# Patient Record
Sex: FEMALE | Race: WHITE | Employment: FULL TIME | URBAN - METROPOLITAN AREA
[De-identification: names, ages, dates, MRNs, and addresses within clinical notes are randomized per-mention and may not be internally consistent; named-entity substitution may affect disease eponyms.]

---

## 2017-01-17 ENCOUNTER — OFFICE VISIT (OUTPATIENT)
Dept: ORTHOPEDIC SURGERY | Age: 57
End: 2017-01-17

## 2017-01-17 VITALS — WEIGHT: 153 LBS | HEIGHT: 67 IN | BODY MASS INDEX: 24.01 KG/M2

## 2017-01-17 DIAGNOSIS — M65.332 TRIGGER MIDDLE FINGER OF LEFT HAND: Primary | ICD-10-CM

## 2017-01-17 PROCEDURE — 99213 OFFICE O/P EST LOW 20 MIN: CPT | Performed by: ORTHOPAEDIC SURGERY

## 2017-04-19 PROBLEM — Z98.890 S/P LEFT KNEE ARTHROSCOPY: Status: ACTIVE | Noted: 2017-04-19

## 2017-04-20 PROBLEM — G89.29 CHRONIC PAIN OF LEFT KNEE: Status: ACTIVE | Noted: 2017-04-20

## 2017-04-20 PROBLEM — S83.232A COMPLEX TEAR OF MEDIAL MENISCUS OF LEFT KNEE AS CURRENT INJURY: Status: ACTIVE | Noted: 2017-04-20

## 2017-04-20 PROBLEM — M25.562 CHRONIC PAIN OF LEFT KNEE: Status: ACTIVE | Noted: 2017-04-20

## 2017-04-20 PROBLEM — M17.12 PRIMARY OSTEOARTHRITIS OF LEFT KNEE: Status: ACTIVE | Noted: 2017-04-20

## 2017-04-20 PROBLEM — M62.9 HAMSTRING TIGHTNESS OF BOTH LOWER EXTREMITIES: Status: ACTIVE | Noted: 2017-04-20

## 2017-07-10 ENCOUNTER — TELEPHONE (OUTPATIENT)
Dept: ORTHOPEDIC SURGERY | Age: 57
End: 2017-07-10

## 2017-08-04 ENCOUNTER — HOSPITAL ENCOUNTER (OUTPATIENT)
Dept: PHYSICAL THERAPY | Age: 57
Discharge: HOME | End: 2017-08-31
Admitting: ORTHOPAEDIC SURGERY

## 2017-10-16 ENCOUNTER — HOSPITAL ENCOUNTER (OUTPATIENT)
Dept: MAMMOGRAPHY | Age: 57
Discharge: HOME | End: 2017-10-16
Attending: INTERNAL MEDICINE | Admitting: INTERNAL MEDICINE

## 2017-10-16 DIAGNOSIS — Z12.31 VISIT FOR SCREENING MAMMOGRAM: ICD-10-CM

## 2018-01-03 ENCOUNTER — OFFICE VISIT (OUTPATIENT)
Dept: INTERNAL MEDICINE | Age: 58
End: 2018-01-03

## 2018-01-03 VITALS
SYSTOLIC BLOOD PRESSURE: 130 MMHG | HEIGHT: 67 IN | WEIGHT: 147 LBS | BODY MASS INDEX: 23.07 KG/M2 | DIASTOLIC BLOOD PRESSURE: 78 MMHG

## 2018-01-03 DIAGNOSIS — Z12.4 PAP SMEAR FOR CERVICAL CANCER SCREENING: ICD-10-CM

## 2018-01-03 DIAGNOSIS — E55.9 VITAMIN D DEFICIENCY: ICD-10-CM

## 2018-01-03 DIAGNOSIS — Z98.890 S/P LEFT KNEE ARTHROSCOPY: ICD-10-CM

## 2018-01-03 DIAGNOSIS — Z00.00 WELL ADULT EXAM: Primary | ICD-10-CM

## 2018-01-03 PROBLEM — M62.9 HAMSTRING TIGHTNESS OF BOTH LOWER EXTREMITIES: Status: RESOLVED | Noted: 2017-04-20 | Resolved: 2018-01-03

## 2018-01-03 PROBLEM — S83.232A COMPLEX TEAR OF MEDIAL MENISCUS OF LEFT KNEE AS CURRENT INJURY: Status: RESOLVED | Noted: 2017-04-20 | Resolved: 2018-01-03

## 2018-01-03 PROBLEM — M25.562 CHRONIC PAIN OF LEFT KNEE: Status: RESOLVED | Noted: 2017-04-20 | Resolved: 2018-01-03

## 2018-01-03 PROBLEM — G89.29 CHRONIC PAIN OF LEFT KNEE: Status: RESOLVED | Noted: 2017-04-20 | Resolved: 2018-01-03

## 2018-01-03 PROCEDURE — 99396 PREV VISIT EST AGE 40-64: CPT | Performed by: INTERNAL MEDICINE

## 2018-01-03 PROCEDURE — 93000 ELECTROCARDIOGRAM COMPLETE: CPT | Performed by: INTERNAL MEDICINE

## 2018-01-05 LAB
A/G RATIO: 1.9 (ref 1.1–2.2)
ALBUMIN SERPL-MCNC: 4.5 G/DL (ref 3.4–5)
ALP BLD-CCNC: 54 U/L (ref 40–129)
ALT SERPL-CCNC: 16 U/L (ref 10–40)
ANION GAP SERPL CALCULATED.3IONS-SCNC: 15 MMOL/L (ref 3–16)
AST SERPL-CCNC: 20 U/L (ref 15–37)
BASOPHILS ABSOLUTE: 0 K/UL (ref 0–0.2)
BASOPHILS RELATIVE PERCENT: 0.6 %
BILIRUB SERPL-MCNC: 0.6 MG/DL (ref 0–1)
BILIRUBIN URINE: NEGATIVE
BLOOD, URINE: NEGATIVE
BUN BLDV-MCNC: 12 MG/DL (ref 7–20)
CALCIUM SERPL-MCNC: 9.6 MG/DL (ref 8.3–10.6)
CHLORIDE BLD-SCNC: 99 MMOL/L (ref 99–110)
CHOLESTEROL, TOTAL: 223 MG/DL (ref 0–199)
CLARITY: CLEAR
CO2: 25 MMOL/L (ref 21–32)
COLOR: YELLOW
CREAT SERPL-MCNC: 0.6 MG/DL (ref 0.6–1.1)
EOSINOPHILS ABSOLUTE: 0.1 K/UL (ref 0–0.6)
EOSINOPHILS RELATIVE PERCENT: 2.9 %
GFR AFRICAN AMERICAN: >60
GFR NON-AFRICAN AMERICAN: >60
GLOBULIN: 2.4 G/DL
GLUCOSE BLD-MCNC: 89 MG/DL (ref 70–99)
GLUCOSE URINE: NEGATIVE MG/DL
HCT VFR BLD CALC: 41.6 % (ref 36–48)
HDLC SERPL-MCNC: 130 MG/DL (ref 40–60)
HEMOGLOBIN: 13.9 G/DL (ref 12–16)
KETONES, URINE: NEGATIVE MG/DL
LDL CHOLESTEROL CALCULATED: 84 MG/DL
LEUKOCYTE ESTERASE, URINE: NEGATIVE
LYMPHOCYTES ABSOLUTE: 1.6 K/UL (ref 1–5.1)
LYMPHOCYTES RELATIVE PERCENT: 36.2 %
MCH RBC QN AUTO: 31.2 PG (ref 26–34)
MCHC RBC AUTO-ENTMCNC: 33.4 G/DL (ref 31–36)
MCV RBC AUTO: 93.5 FL (ref 80–100)
MICROSCOPIC EXAMINATION: NORMAL
MONOCYTES ABSOLUTE: 0.5 K/UL (ref 0–1.3)
MONOCYTES RELATIVE PERCENT: 10.5 %
NEUTROPHILS ABSOLUTE: 2.2 K/UL (ref 1.7–7.7)
NEUTROPHILS RELATIVE PERCENT: 49.8 %
NITRITE, URINE: NEGATIVE
PDW BLD-RTO: 14.3 % (ref 12.4–15.4)
PH UA: 7
PLATELET # BLD: 189 K/UL (ref 135–450)
PMV BLD AUTO: 9.9 FL (ref 5–10.5)
POTASSIUM SERPL-SCNC: 5.2 MMOL/L (ref 3.5–5.1)
PROTEIN UA: NEGATIVE MG/DL
RBC # BLD: 4.45 M/UL (ref 4–5.2)
SODIUM BLD-SCNC: 139 MMOL/L (ref 136–145)
SPECIFIC GRAVITY UA: 1.01
TOTAL PROTEIN: 6.9 G/DL (ref 6.4–8.2)
TRIGL SERPL-MCNC: 43 MG/DL (ref 0–150)
TSH SERPL DL<=0.05 MIU/L-ACNC: 1.6 UIU/ML (ref 0.27–4.2)
URINE TYPE: NORMAL
UROBILINOGEN, URINE: 0.2 E.U./DL
VLDLC SERPL CALC-MCNC: 9 MG/DL
WBC # BLD: 4.4 K/UL (ref 4–11)

## 2018-03-01 PROBLEM — M62.559 ATROPHY OF QUADRICEPS FEMORIS MUSCLE: Status: ACTIVE | Noted: 2018-03-01

## 2018-03-01 PROBLEM — M22.42 CHONDROMALACIA PATELLAE OF LEFT KNEE: Status: ACTIVE | Noted: 2018-03-01

## 2018-03-07 ENCOUNTER — HOSPITAL ENCOUNTER (OUTPATIENT)
Dept: PHYSICAL THERAPY | Age: 58
Discharge: HOME | End: 2018-03-07
Attending: ORTHOPAEDIC SURGERY | Admitting: ORTHOPAEDIC SURGERY

## 2018-03-07 ENCOUNTER — HOSPITAL ENCOUNTER (OUTPATIENT)
Dept: PHYSICAL THERAPY | Age: 58
Discharge: HOME | End: 2018-03-31
Admitting: ORTHOPAEDIC SURGERY

## 2018-03-07 NOTE — PLAN OF CARE
[facility]                                                       Physical Therapy Certification    Dear Referring Practitioner: Dr. Dariel Caldwell,    We had the pleasure of evaluating the following patient for physical therapy services at [address] [place] [address]. A summary of our findings can be found in the initial assessment below. This includes our plan of care. If you have any questions or concerns regarding these findings, please do not hesitate to contact me at the office phone number checked above. Thank you for the referral.       Physician Signature:_______________________________Date:__________________  By signing above (or electronic signature), therapists plan is approved by physician      Patient: Kaley Boone   : 1960   MRN: [id number]  Referring Physician: Referring Practitioner: Dr. Dariel Caldwell      Evaluation Date: 3/7/2018      Medical Diagnosis Information:  Diagnosis: M17.12 (ICD-10-CM) - Primary osteoarthritis of left knee   Treatment Diagnosis: Decreased functional mobility ; Decreased strength; resulting in pain with increased knee load due to potential knee OA                                         Insurance information: PT Insurance Information: PT BENEFITS 2017 FACILITY/ ANTHEM/ EFFECTIVE 17/ACTIVE/ DED 3000/ OOP 6000/ PAYS 80%/ 60 VPCY COMB/ 0 AUTH/ MONDAY/ 8-3-17 PAG/ PW DONE CB/      Precautions/ Contra-indications: N/A  Latex Allergy:  [x]NO      []YES  Preferred Language for Healthcare:   [x]English       []other:    SUBJECTIVE: Patient is a 55 y/o female who presents with c/o recurring chronic medial L Knee pain. Reports initially aggravating her knee after a long trip in May 2017 that involved a lot of walking. Was able to mitigate her pain with ice and a guided HEP.   Then in the beginning of , she started training for a 500 mile experience  []other:  Justification:     Falls Risk Assessment (30 days):   [x] Falls Risk assessed and no intervention required. [] Falls Risk assessed and Patient requires intervention due to being higher risk   TUG score (>12s at risk):     [] Falls education provided, including       G-Codes:  PT G-Codes  Functional Assessment Tool Used: LEFS  Score: 67/68 or 2% deficit  Functional Limitation: Mobility: Walking and moving around  Mobility: Walking and Moving Around Current Status (): At least 1 percent but less than 20 percent impaired, limited or restricted  Mobility: Walking and Moving Around Goal Status ():  At least 1 percent but less than 20 percent impaired, limited or restricted    ASSESSMENT:   Functional Impairments:     []Noted lumbar/proximal hip/LE hypomobility   []Decreased LE functional ROM   [x]Decreased core/proximal hip strength and neuromuscular control   [x]Decreased LE functional strength   []Reduced balance/proprioceptive control   []other:      Functional Activity Limitations (from functional questionnaire and intake)   []Reduced ability to tolerate prolonged functional positions   []Reduced ability or difficulty with changes of positions or transfers between positions   []Reduced ability to maintain good posture and demonstrate good body mechanics with sitting, bending, and lifting   []Reduced ability to sleep   [] Reduced ability or tolerance with driving and/or computer work   [x]Reduced ability to perform lifting, carrying tasks   [x]Reduced ability to squat   []Reduced ability to forward bend   []Reduced ability to ambulate prolonged functional periods/distances/surfaces   [x]Reduced ability to ascend/descend stairs   [x]Reduced ability to run, hop or jump   []other:     Participation Restrictions   []Reduced participation in self care activities   []Reduced participation in home management activities   []Reduced participation in work activities   [x]Reduced participation in social activities. [x]Reduced participation in sport activities. Classification :    []Signs/symptoms consistent with post-surgical status including decreased ROM, strength and function. []Signs/symptoms consistent with joint sprain/strain   []Signs/symptoms consistent with patella-femoral syndrome   [x]Signs/symptoms consistent with knee OA/hip OA   []Signs/symptoms consistent with internal derangement of knee/Hip   []Signs/symptoms consistent with functional hip weakness/NMR control      []Signs/symptoms consistent with tendinitis/tendinosis    []signs/symptoms consistent with pathology which may benefit from Dry needling      []other:      Prognosis/Rehab Potential:      [x]Excellent   []Good    []Fair   []Poor    Tolerance of evaluation/treatment:    [x]Excellent   []Good    []Fair   []Poor    Physical Therapy Evaluation Complexity Justification  [x] A history of present problem with:  [] no personal factors and/or comorbidities that impact the plan of care;  [x]1-2 personal factors and/or comorbidities that impact the plan of care  []3 personal factors and/or comorbidities that impact the plan of care  [x] An examination of body systems using standardized tests and measures addressing any of the following: body structures and functions (impairments), activity limitations, and/or participation restrictions;:  [x] a total of 1-2 or more elements   [] a total of 3 or more elements   [] a total of 4 or more elements   [x] A clinical presentation with:  [x] stable and/or uncomplicated characteristics   [] evolving clinical presentation with changing characteristics  [] unstable and unpredictable characteristics;   [x] Clinical decision making of [x] low, [] moderate, [] high complexity using standardized patient assessment instrument and/or measurable assessment of functional outcome.     [x] EVAL (LOW) 67305 (typically 20 minutes face-to-face)  [] EVAL (MOD) 02075 (typically 30 minutes face-to-face)  [] EVAL strength and control in LE to allow for proper functional mobility as indicated by patients Functional Deficits. 4. Patient will return to functional activities without increased symptoms or restriction. 5. Patient will bicycle 100 miles without increased symptoms or restriction. Electronically signed by:  Renetta Elise DPT, [id number]  Physical Therapist  [email or web address]

## 2018-04-01 ENCOUNTER — HOSPITAL ENCOUNTER (OUTPATIENT)
Dept: PHYSICAL THERAPY | Age: 58
Discharge: HOME | End: 2018-04-30
Attending: ORTHOPAEDIC SURGERY | Admitting: ORTHOPAEDIC SURGERY

## 2018-04-11 ENCOUNTER — OFFICE VISIT (OUTPATIENT)
Dept: ORTHOPEDIC SURGERY | Age: 58
End: 2018-04-11

## 2018-04-11 VITALS
DIASTOLIC BLOOD PRESSURE: 69 MMHG | WEIGHT: 144 LBS | HEART RATE: 50 BPM | SYSTOLIC BLOOD PRESSURE: 120 MMHG | BODY MASS INDEX: 23.14 KG/M2 | HEIGHT: 66 IN

## 2018-04-11 DIAGNOSIS — M25.562 CHRONIC PAIN OF LEFT KNEE: Primary | ICD-10-CM

## 2018-04-11 DIAGNOSIS — G89.29 CHRONIC PAIN OF LEFT KNEE: Primary | ICD-10-CM

## 2018-04-11 PROCEDURE — G8427 DOCREV CUR MEDS BY ELIG CLIN: HCPCS | Performed by: ORTHOPAEDIC SURGERY

## 2018-04-11 PROCEDURE — G8420 CALC BMI NORM PARAMETERS: HCPCS | Performed by: ORTHOPAEDIC SURGERY

## 2018-04-11 PROCEDURE — 3017F COLORECTAL CA SCREEN DOC REV: CPT | Performed by: ORTHOPAEDIC SURGERY

## 2018-04-11 PROCEDURE — 1036F TOBACCO NON-USER: CPT | Performed by: ORTHOPAEDIC SURGERY

## 2018-04-11 PROCEDURE — 3014F SCREEN MAMMO DOC REV: CPT | Performed by: ORTHOPAEDIC SURGERY

## 2018-04-11 PROCEDURE — 99213 OFFICE O/P EST LOW 20 MIN: CPT | Performed by: ORTHOPAEDIC SURGERY

## 2018-06-29 ENCOUNTER — TELEPHONE (OUTPATIENT)
Dept: INTERNAL MEDICINE | Age: 58
End: 2018-06-29

## 2018-06-29 RX ORDER — BETAMETHASONE DIPROPIONATE 0.05 %
OINTMENT (GRAM) TOPICAL
Qty: 15 G | Refills: 1 | Status: SHIPPED | OUTPATIENT
Start: 2018-06-29 | End: 2020-10-06

## 2018-06-29 RX ORDER — METHYLPREDNISOLONE 4 MG/1
TABLET ORAL
Qty: 1 KIT | Refills: 0 | Status: SHIPPED | OUTPATIENT
Start: 2018-06-29 | End: 2018-07-05

## 2019-01-03 ENCOUNTER — HOSPITAL ENCOUNTER (OUTPATIENT)
Dept: MAMMOGRAPHY | Age: 59
Discharge: HOME | End: 2019-01-03
Payer: COMMERCIAL

## 2019-01-03 DIAGNOSIS — Z12.31 VISIT FOR SCREENING MAMMOGRAM: ICD-10-CM

## 2019-01-03 PROCEDURE — 77063 BREAST TOMOSYNTHESIS BI: CPT

## 2019-04-29 ENCOUNTER — TELEPHONE (OUTPATIENT)
Dept: INTERNAL MEDICINE CLINIC | Age: 59
End: 2019-04-29

## 2019-04-29 RX ORDER — METHYLPREDNISOLONE 4 MG/1
TABLET ORAL
Qty: 1 KIT | Refills: 0 | Status: SHIPPED | OUTPATIENT
Start: 2019-04-29 | End: 2019-05-05

## 2019-04-29 NOTE — TELEPHONE ENCOUNTER
Patient thinks she has poison oak again and would like a prescription for prednisone. Patient was treated June 2018.

## 2019-05-02 ENCOUNTER — TELEPHONE (OUTPATIENT)
Dept: INTERNAL MEDICINE CLINIC | Age: 59
End: 2019-05-02

## 2019-05-02 NOTE — TELEPHONE ENCOUNTER
PA SUBMITTED FOR Betamethasone Dipropionate 0.05% EX OINT  [employer] - PA Case ID: [id number] - Rx #: [id number]   STATUS: PENDING

## 2019-10-24 ENCOUNTER — OFFICE VISIT (OUTPATIENT)
Dept: INTERNAL MEDICINE CLINIC | Age: 59
End: 2019-10-24
Payer: COMMERCIAL

## 2019-10-24 VITALS
SYSTOLIC BLOOD PRESSURE: 112 MMHG | HEART RATE: 57 BPM | HEIGHT: 67 IN | BODY MASS INDEX: 22.88 KG/M2 | DIASTOLIC BLOOD PRESSURE: 72 MMHG | OXYGEN SATURATION: 98 % | WEIGHT: 145.8 LBS

## 2019-10-24 DIAGNOSIS — Z00.00 HEALTHCARE MAINTENANCE: ICD-10-CM

## 2019-10-24 DIAGNOSIS — Z00.00 ANNUAL PHYSICAL EXAM: Primary | ICD-10-CM

## 2019-10-24 LAB — HEPATITIS C ANTIBODY INTERPRETATION: NORMAL

## 2019-10-24 PROCEDURE — 99396 PREV VISIT EST AGE 40-64: CPT | Performed by: INTERNAL MEDICINE

## 2019-10-24 ASSESSMENT — PATIENT HEALTH QUESTIONNAIRE - PHQ9
SUM OF ALL RESPONSES TO PHQ QUESTIONS 1-9: 0
1. LITTLE INTEREST OR PLEASURE IN DOING THINGS: 0
2. FEELING DOWN, DEPRESSED OR HOPELESS: 0
SUM OF ALL RESPONSES TO PHQ9 QUESTIONS 1 & 2: 0
SUM OF ALL RESPONSES TO PHQ QUESTIONS 1-9: 0

## 2019-10-24 ASSESSMENT — ENCOUNTER SYMPTOMS
NAUSEA: 0
BLOOD IN STOOL: 0
EYE REDNESS: 0
COUGH: 0
ABDOMINAL PAIN: 0
COLOR CHANGE: 0
SHORTNESS OF BREATH: 0
DIARRHEA: 0

## 2019-10-25 LAB
HIV AG/AB: NORMAL
HIV ANTIGEN: NORMAL
HIV-1 ANTIBODY: NORMAL
HIV-2 AB: NORMAL

## 2020-01-27 ENCOUNTER — HOSPITAL ENCOUNTER (OUTPATIENT)
Dept: MAMMOGRAPHY | Age: 60
Discharge: HOME | End: 2020-01-27
Payer: COMMERCIAL

## 2020-01-27 PROCEDURE — 77063 BREAST TOMOSYNTHESIS BI: CPT

## 2020-09-22 ENCOUNTER — TELEPHONE (OUTPATIENT)
Dept: INTERNAL MEDICINE CLINIC | Age: 60
End: 2020-09-22

## 2020-09-22 NOTE — TELEPHONE ENCOUNTER
The patient last saw Dr. Joshua Hagen for her physical 10/2019 and she want to continue to see Dr. Joshua Hagen and asking if she can change.  Please advise

## 2020-10-06 ENCOUNTER — OFFICE VISIT (OUTPATIENT)
Dept: INTERNAL MEDICINE CLINIC | Age: 60
End: 2020-10-06
Payer: COMMERCIAL

## 2020-10-06 VITALS
OXYGEN SATURATION: 98 % | TEMPERATURE: 97.5 F | WEIGHT: 146.8 LBS | HEIGHT: 67 IN | RESPIRATION RATE: 14 BRPM | DIASTOLIC BLOOD PRESSURE: 78 MMHG | BODY MASS INDEX: 23.04 KG/M2 | SYSTOLIC BLOOD PRESSURE: 118 MMHG | HEART RATE: 57 BPM

## 2020-10-06 PROBLEM — M62.559 ATROPHY OF QUADRICEPS FEMORIS MUSCLE: Status: RESOLVED | Noted: 2018-03-01 | Resolved: 2020-10-06

## 2020-10-06 PROBLEM — G89.29 CHRONIC PAIN OF LEFT KNEE: Status: RESOLVED | Noted: 2017-04-20 | Resolved: 2020-10-06

## 2020-10-06 PROBLEM — M25.562 CHRONIC PAIN OF LEFT KNEE: Status: RESOLVED | Noted: 2017-04-20 | Resolved: 2020-10-06

## 2020-10-06 PROCEDURE — 99396 PREV VISIT EST AGE 40-64: CPT | Performed by: INTERNAL MEDICINE

## 2020-10-06 SDOH — ECONOMIC STABILITY: INCOME INSECURITY: HOW HARD IS IT FOR YOU TO PAY FOR THE VERY BASICS LIKE FOOD, HOUSING, MEDICAL CARE, AND HEATING?: NOT HARD AT ALL

## 2020-10-06 SDOH — ECONOMIC STABILITY: FOOD INSECURITY: WITHIN THE PAST 12 MONTHS, THE FOOD YOU BOUGHT JUST DIDN'T LAST AND YOU DIDN'T HAVE MONEY TO GET MORE.: NEVER TRUE

## 2020-10-06 SDOH — ECONOMIC STABILITY: TRANSPORTATION INSECURITY
IN THE PAST 12 MONTHS, HAS THE LACK OF TRANSPORTATION KEPT YOU FROM MEDICAL APPOINTMENTS OR FROM GETTING MEDICATIONS?: NO

## 2020-10-06 SDOH — ECONOMIC STABILITY: FOOD INSECURITY: WITHIN THE PAST 12 MONTHS, YOU WORRIED THAT YOUR FOOD WOULD RUN OUT BEFORE YOU GOT MONEY TO BUY MORE.: NEVER TRUE

## 2020-10-06 SDOH — ECONOMIC STABILITY: TRANSPORTATION INSECURITY
IN THE PAST 12 MONTHS, HAS LACK OF TRANSPORTATION KEPT YOU FROM MEETINGS, WORK, OR FROM GETTING THINGS NEEDED FOR DAILY LIVING?: NO

## 2020-10-06 ASSESSMENT — ENCOUNTER SYMPTOMS
CHEST TIGHTNESS: 0
SHORTNESS OF BREATH: 0
EYE REDNESS: 0
CONSTIPATION: 0
BLOOD IN STOOL: 0
NAUSEA: 0
COLOR CHANGE: 0
DIARRHEA: 0
ABDOMINAL PAIN: 0
COUGH: 0

## 2020-10-06 ASSESSMENT — PATIENT HEALTH QUESTIONNAIRE - PHQ9
DEPRESSION UNABLE TO ASSESS: FUNCTIONAL CAPACITY MOTIVATION LIMITS ACCURACY
2. FEELING DOWN, DEPRESSED OR HOPELESS: 0
SUM OF ALL RESPONSES TO PHQ9 QUESTIONS 1 & 2: 0
SUM OF ALL RESPONSES TO PHQ QUESTIONS 1-9: 0
1. LITTLE INTEREST OR PLEASURE IN DOING THINGS: 0
SUM OF ALL RESPONSES TO PHQ QUESTIONS 1-9: 0

## 2020-10-06 NOTE — ASSESSMENT & PLAN NOTE
Here for annual physical exam, overall doing well from a clinical standpoint and has no concerns. As for health maintenance:  -Normal Pap without HPV 2018, due on next annual 2021  -History of abnormal mammography in 2016, benign biopsy, gets yearly mammograms. Mother had likely benign breast tumor, had hysterectomy, unclear why she will try to clarify with her sister.  -Normal colonoscopy in 2012, due 2022  -Not indicated for lung cancer screening  -BP WNL today  -A1c, lipid panel ordered  -HIV and HCV negative (2019)  -Negative depression and DV screen today  -Up-to-date with flu, Tdap, shingles  -Advised to eat a healthy, well-balanced diet  -Advised to exercise at least 30min/day 5x/week for heart and bone health  -noted dry skin, keep skin moisturized, check skin regularly for now moles or changes in moles. wear sunscreen at least SPF 30 and don't forget to reapply every 3-4 hours or if you are in the water  -Follow up with dentist at least twice/year.  -Follow up with eye doctor at least once/year.  -Calcium goal is 5653-8151 mg a day ideally from diet (dairy, green leafy vegetables, nuts)   -Discussed importance of living will, pt has one since 2013, scanned.

## 2020-10-06 NOTE — PATIENT INSTRUCTIONS
- try palin white bar Dove soap  - Keep your skin hydrated- Emollients are creams and ointments that moisturize the skin and prevent it from drying out. The best emollients for people with eczema are thick creams (such as Eucerin, Cetaphil, and Nutraderm, cerave cream and vanicream) or ointments (such as petroleum jelly, Aquaphor, and Vaseline), which contain little to no water. Emollients are most effective when applied immediately after bathing. Emollients can be applied twice a day or more often if needed. Lotions contain more water than creams and ointments and are less effective for moisturizing the skin.

## 2020-10-06 NOTE — ASSESSMENT & PLAN NOTE
[facility] first in 2015, echo showed no valvular pathology, denies any symptoms, other than murmur unremarkable exam.  physically active without limitation or symptoms  -Continue to monitor clinically for now

## 2020-10-06 NOTE — PROGRESS NOTES
Form completed and faxed,09/14/18    Angelia Calvin/EMELY     [facility] Internal Medicine  Preventive medicine/ physical exam visit   10/6/2020    Jose De Jesus Steiner (:  1960) [place] 64 y.o. female, here for a preventive medicine evaluation. Patient Active Problem List   Diagnosis    Vitamin D deficiency    Menopause    Annual physical exam    Fibrocystic breast disease    Heart murmur, systolic    S/P left knee arthroscopy, chondroplasty, and synovectomy with medial meniscus fraying 2012    Primary osteoarthritis of left knee    Chondromalacia patellae of left knee       HPI  Here for annual physical exam overall doing well from a clinical standpoint and has no concerns. Chronic conditions and Medications reviewed and updated today. Social and Lifestyle reviewed and updated today. Continues to exercise regularly in her home gym (yoga, biking, walking, spinning, Pilates). Diet is very well-balanced, plant-based, a lot of salads. Menopause over the last 8 years or so, has mild hot flashes, no symptoms of atrophic vaginitis    Screening:    Cancer:    - Cervical cancer: Pap smear  was negative, HPV not tested, due in    - Breast cancer: History of abnormal mammography in , biopsy was benign. Gets yearly mammograms, last 2020 benign.  mother had breast tumor removed, which she believes was benign.   - Colon cancer: Normal colonoscopy in , due  no FH of colon cancer.     - lung cancer- not indicated  Other screening:   - Hypertension: BP WNL today    - Diabetes mellitus/ Hyperlipidemia:   Lab Results   Component Value Date    CHOL 223 2018    CHOL 208 2016    CHOL 191 10/06/2015    TRIG 43 2018    TRIG 48 2016    TRIG 57 10/06/2015     2018     2016     10/06/2015    LDLCALC 84 2018    LDLCALC 92 2016    LDLCALC 79 10/06/2015    GLUCOSE 89 2018    GLUCOSE 96 2016    GLUCOSE 94 10/06/2015   - HIV: Negative ( )  - HCV: Negative ()  - Domestic violence: pt denies   - Depression:  PHQ2 screen- negative  - Dementia: no issues with memory loss   - Vision/ hearing: no issues   - Dentist:  Twice a year    Immunizations:   Immunization History   Administered Date(s) Administered    Influenza 09/28/2015    Influenza Vaccine, unspecified formulation 09/28/2015, 09/30/2016, 11/03/2017    Influenza Virus Vaccine 09/22/2020    Influenza, Teodora Claritza, IM, PF (6 mo and older Fluzone, Flulaval, Fluarix, and 3 yrs and older Afluria) 09/09/2019, 09/22/2020    Tdap (Boostrix, Adacel) 07/09/2012    Zoster Recombinant (Shingrix) 02/09/2020, 07/07/2020       ROS  Review of Systems   Constitutional: Negative for chills, fatigue, fever and unexpected weight change. HENT: Negative for congestion. Eyes: Negative for redness. Respiratory: Negative for cough, chest tightness and shortness of breath. Cardiovascular: Negative for chest pain, palpitations and leg swelling. Gastrointestinal: Negative for abdominal pain, blood in stool, constipation, diarrhea and nausea. Endocrine:        Mild hot flashes   Genitourinary: Negative for dysuria, hematuria and vaginal pain. Musculoskeletal: Negative for gait problem. Skin: Negative for color change. Skin irritation over both forearms   Neurological: Negative for dizziness, syncope, weakness, numbness and headaches. Psychiatric/Behavioral: Negative for agitation and confusion. HISTORIES  Current Outpatient Medications on File Prior to Visit   Medication Sig Dispense Refill    Omega-3 Fatty Acids (OMEGA 3 500 PO) Take 500 mg by mouth daily      vitamin D 25 MCG (1000 UT) CAPS Take 1,000 Units by mouth       No current facility-administered medications on file prior to visit.          No Known Allergies    Past Medical History:   Diagnosis Date    ACL (anterior cruciate ligament) tear 9/10/2013    Fibrocystic breast disease 10/3/2014    Heart murmur, systolic 98/8/1613    Iron deficiency anemia 7/13/2012    Meniscus tear 7/9/2012    Menopause 12/5/2012    Unspecified vitamin D deficiency 7/12/2012       Past Surgical History:   Procedure Laterality Date    COLONOSCOPY  08/24/2012    Normal, Brittnee Rodríguez repeat in 10 years    KNEE ARTHROSCOPY  8/1/2012    LEFT;CHONDROPLASTY,SYNOVECTOMY    MENISCECTOMY  2013    Meniscus repair       OB History    No obstetric history on file. Social History     Socioeconomic History    Marital status:      Spouse name: Not on file    Number of children: 3    Years of education: Not on file    Highest education level: Not on file   Occupational History     Employer: [employer] resource strain: Not hard at all   tomoguides insecurity     Worry: Never true     Inability: Never true   [employer] needs     Medical: No     Non-medical: No   Tobacco Use    Smoking status: Never Smoker    Smokeless tobacco: Never Used   Substance and Sexual Activity    Alcohol use:  Yes     Alcohol/week: 5.0 standard drinks     Types: 2 Glasses of wine, 3 Cans of beer per week    Drug use: No    Sexual activity: Yes     Partners: Female   Lifestyle    Physical activity     Days per week: Not on file     Minutes per session: Not on file    Stress: Not on file   Relationships    Social connections     Talks on phone: Not on file     Gets together: Not on file     Attends Yazdanism service: Not on file     Active member of club or organization: Not on file     Attends meetings of clubs or organizations: Not on file     Relationship status: Not on file    Intimate partner violence     Fear of current or ex partner: Not on file     Emotionally abused: Not on file     Physically abused: Not on file     Forced sexual activity: Not on file   Other Topics Concern    Not on file   Social History Narrative    Not on file        Family History   Problem Relation Age of Onset    Cancer Mother     Heart Disease Father        Advanced directive: N, <no information>    PE  Vitals:    10/06/20 0943   BP: 118/78   Site: Left Upper Arm   Position: Sitting   Cuff Size: Medium Adult   Pulse: 57   Resp: 14   Temp: 97.5 °F (36.4 °C)   TempSrc: Oral   SpO2: 98%   Weight: 146 lb 12.8 oz (66.6 kg)   Height: 5' 7\" (1.702 m)     Estimated body mass index is 22.99 kg/m² as calculated from the following:    Height as of this encounter: 5' 7\" (1.702 m). Weight as of this encounter: 146 lb 12.8 oz (66.6 kg). Physical Exam  Vitals signs reviewed. Constitutional:       General: She is not in acute distress. Appearance: Normal appearance. She is well-developed. She is not ill-appearing, toxic-appearing or diaphoretic. HENT:      Head: Normocephalic and atraumatic. Nose: Nose normal.      Mouth/Throat:      Mouth: Mucous membranes are moist.      Pharynx: Oropharynx is clear. No oropharyngeal exudate or posterior oropharyngeal erythema. Eyes:      General: No scleral icterus. Conjunctiva/sclera: Conjunctivae normal.      Pupils: Pupils are equal, round, and reactive to light. Neck:      Musculoskeletal: Normal range of motion and neck supple. Cardiovascular:      Rate and Rhythm: Normal rate and regular rhythm. Heart sounds: Murmur (2/6 systolic, all stations, not radiating) present. Pulmonary:      Effort: Pulmonary effort is normal. No respiratory distress. Breath sounds: Normal breath sounds. Abdominal:      General: There is no distension. Palpations: Abdomen is soft. Tenderness: There is no abdominal tenderness. Musculoskeletal: Normal range of motion. General: No swelling or deformity. Right lower leg: No edema. Left lower leg: No edema. Lymphadenopathy:      Cervical: No cervical adenopathy. Skin:     General: Skin is warm and dry. Coloration: Skin is not jaundiced. Findings: Rash present. Neurological:      Mental Status: She is alert and oriented to person, place, and time. Psychiatric:         Behavior: Behavior normal.         The ASCVD Risk score (Juan Diaz., et al., 2013) failed to calculate for the following reasons: The valid HDL cholesterol range is 20 to 100 mg/dL    Immunization History   Administered Date(s) Administered    Influenza 09/28/2015    Influenza Vaccine, unspecified formulation 09/28/2015, 09/30/2016, 11/03/2017    Influenza Virus Vaccine 09/22/2020    Influenza, Teodora Claritza, IM, PF (6 mo and older Fluzone, Flulaval, Fluarix, and 3 yrs and older Afluria) 09/09/2019, 09/22/2020    Tdap (Boostrix, Adacel) 07/09/2012    Zoster Recombinant (Shingrix) 02/09/2020, 07/07/2020       Health Maintenance   Topic Date Due    Cervical cancer screen  01/03/2021    Breast cancer screen  01/27/2022    DTaP/Tdap/Td vaccine (2 - Td) 07/09/2022    Colon cancer screen colonoscopy  08/24/2022    Lipid screen  01/05/2023    Flu vaccine  Completed    Shingles Vaccine  Completed    Hepatitis C screen  Completed    HIV screen  Completed    Hepatitis A vaccine  Aged Out    Hepatitis B vaccine  Aged Out    Hib vaccine  Aged Out    Meningococcal (ACWY) vaccine  Aged Out    Pneumococcal 0-64 years Vaccine  Aged Out       ASSESSMENT/ PLAN:  Annual physical exam  Here for annual physical exam, overall doing well from a clinical standpoint and has no concerns. As for health maintenance:  -Normal Pap without HPV 2018, due on next annual 2021  -History of abnormal mammography in 2016, benign biopsy, gets yearly mammograms.   Mother had likely benign breast tumor, had hysterectomy, unclear why she will try to clarify with her sister.  -Normal colonoscopy in 2012, due 2022  -Not indicated for lung cancer screening  -BP WNL today  -A1c, lipid panel ordered  -HIV and HCV negative (2019)  -Negative depression and DV screen today  -Up-to-date with flu, Tdap, shingles  -Advised to eat a healthy, well-balanced diet  -Advised to exercise at least 30min/day 5x/week for heart and bone health  -noted dry skin, keep skin moisturized, check skin regularly for now moles or changes in moles. wear sunscreen at least SPF 30 and don't forget to reapply every 3-4 hours or if you are in the water  -Follow up with dentist at least twice/year.  -Follow up with eye doctor at least once/year.  -Calcium goal is 3309-6757 mg a day ideally from diet (dairy, green leafy vegetables, nuts)   -Discussed importance of living will, pt has one since 2013, scanned. Vitamin D deficiency  -Repeat vitamin D  - continue 1000 units daily for now    Heart murmur, systolic  [place] first in 3630, echo showed no valvular pathology, denies any symptoms, other than murmur unremarkable exam.  physically active without limitation or symptoms  -Continue to monitor clinically for now        Orders Placed This Encounter   Procedures    Hemoglobin A1C    Lipid Panel    Vitamin D 25 Hydroxy      No orders of the defined types were placed in this encounter.      Medications Discontinued During This Encounter   Medication Reason    betamethasone dipropionate (DIPROLENE) 0.05 % ointment LIST CLEANUP      Return in about 1 year (around 10/6/2021) for Annual physical exam.    Sherrell Akbar MD

## 2020-10-07 DIAGNOSIS — Z00.00 ANNUAL PHYSICAL EXAM: ICD-10-CM

## 2020-10-07 DIAGNOSIS — E55.9 VITAMIN D DEFICIENCY: ICD-10-CM

## 2020-10-07 LAB
CHOLESTEROL, TOTAL: 252 MG/DL (ref 0–199)
ESTIMATED AVERAGE GLUCOSE: 108.3 MG/DL
HBA1C MFR BLD: 5.4 %
HDLC SERPL-MCNC: 123 MG/DL (ref 40–60)
LDL CHOLESTEROL CALCULATED: 120 MG/DL
TRIGL SERPL-MCNC: 45 MG/DL (ref 0–150)
VITAMIN D 25-HYDROXY: 37.4 NG/ML
VLDLC SERPL CALC-MCNC: 9 MG/DL

## 2021-02-01 ENCOUNTER — HOSPITAL ENCOUNTER (OUTPATIENT)
Dept: MAMMOGRAPHY | Age: 61
Discharge: HOME | End: 2021-02-01
Payer: COMMERCIAL

## 2021-02-01 DIAGNOSIS — Z12.31 VISIT FOR SCREENING MAMMOGRAM: ICD-10-CM

## 2021-02-01 PROCEDURE — 77063 BREAST TOMOSYNTHESIS BI: CPT

## 2021-03-31 ENCOUNTER — TELEPHONE (OUTPATIENT)
Dept: INTERNAL MEDICINE CLINIC | Age: 61
End: 2021-03-31

## 2021-03-31 NOTE — TELEPHONE ENCOUNTER
Pt is requesting some medication for mild anti depression and stated she been on them before just was given from a past provider pls advise        [facility],  [address] [address] [phone]

## 2021-04-06 ENCOUNTER — OFFICE VISIT (OUTPATIENT)
Dept: INTERNAL MEDICINE CLINIC | Age: 61
End: 2021-04-06
Payer: COMMERCIAL

## 2021-04-06 VITALS
TEMPERATURE: 98.4 F | HEART RATE: 61 BPM | SYSTOLIC BLOOD PRESSURE: 120 MMHG | WEIGHT: 148 LBS | DIASTOLIC BLOOD PRESSURE: 80 MMHG | HEIGHT: 67 IN | BODY MASS INDEX: 23.23 KG/M2 | OXYGEN SATURATION: 100 % | RESPIRATION RATE: 16 BRPM

## 2021-04-06 DIAGNOSIS — F33.1 MODERATE EPISODE OF RECURRENT MAJOR DEPRESSIVE DISORDER (HCC): Primary | ICD-10-CM

## 2021-04-06 PROCEDURE — 99213 OFFICE O/P EST LOW 20 MIN: CPT | Performed by: INTERNAL MEDICINE

## 2021-04-06 ASSESSMENT — ENCOUNTER SYMPTOMS
SHORTNESS OF BREATH: 0
EYE REDNESS: 0
ABDOMINAL PAIN: 0
CHEST TIGHTNESS: 0
NAUSEA: 0
COUGH: 0
BACK PAIN: 0

## 2021-04-06 ASSESSMENT — PATIENT HEALTH QUESTIONNAIRE - PHQ9
1. LITTLE INTEREST OR PLEASURE IN DOING THINGS: 0
SUM OF ALL RESPONSES TO PHQ QUESTIONS 1-9: 0
SUM OF ALL RESPONSES TO PHQ QUESTIONS 1-9: 0

## 2021-04-06 NOTE — PROGRESS NOTES
Subjective:      Patient ID: Giselle Cat is a 64 y.o. female    Chief Complaint   Patient presents with    Medication Refill     Antidepressant       HPI     Depression  The patient has felt she has been fighting depression for a long time. She comes in today because she has felt more depressed recently. She says she is here to get a medication. She says she has tried talking with a psychologist in the past.  She last was in counseling 1 year ago. She tries to get regular exercise, get a good night sleep, and get outside. She did take an antidepressant many years ago but has not since then. She thinks the medicine may have helped some back then. She does not recall what medicine she took. Current Outpatient Medications on File Prior to Visit   Medication Sig Dispense Refill    vitamin D 25 MCG (1000 UT) CAPS Take 1,000 Units by mouth      Omega-3 Fatty Acids (OMEGA 3 500 PO) Take 500 mg by mouth daily       No current facility-administered medications on file prior to visit. No Known Allergies    Review of Systems   Constitutional: Negative for appetite change, fatigue, fever and unexpected weight change. HENT: Negative for congestion and hearing loss. Eyes: Negative for redness and visual disturbance. Respiratory: Negative for cough, chest tightness and shortness of breath. Cardiovascular: Negative for chest pain and leg swelling. Gastrointestinal: Negative for abdominal pain and nausea. Endocrine: Negative for cold intolerance, heat intolerance, polydipsia and polyuria. Genitourinary: Negative for dysuria and frequency. Musculoskeletal: Negative for arthralgias, back pain and neck pain. Skin: Negative for rash and wound. Allergic/Immunologic: Negative for environmental allergies. Neurological: Negative for dizziness, weakness and headaches. Hematological: Negative for adenopathy. Does not bruise/bleed easily.    Psychiatric/Behavioral: Negative for sleep disturbance. The patient is not nervous/anxious. Objective:   Physical Exam  Constitutional:       Appearance: Normal appearance. She is well-developed. Cardiovascular:      Rate and Rhythm: Normal rate and regular rhythm. Heart sounds: No murmur. Pulmonary:      Effort: Pulmonary effort is normal.      Breath sounds: Normal breath sounds. Abdominal:      Palpations: Abdomen is soft. Skin:     General: Skin is warm and dry. Findings: No rash. Neurological:      Mental Status: She is alert and oriented to person, place, and time. Psychiatric:      Comments: She gives short direct answers, she did not volunteer any details of her struggles. She did say she has tried exercise, yoga , mindfulness and counseling. Assessment and plan       1. Moderate episode of recurrent major depressive disorder ([facility]  I told her I wanted her to start on sertraline 50 mg, 1/2 pill daily for 6 days then 1 pill daily. She should make an appointment to follow-up with Dr. Ezequiel Carlin in 4 to 5 weeks. She may need a dose titration at that point. She should watch for sexual dysfunction as a potential side effect. - sertraline (ZOLOFT) 50 MG tablet; Take 1 tablet by mouth daily  Dispense: 30 tablet;  Refill: 5

## 2021-05-05 ENCOUNTER — VIRTUAL VISIT (OUTPATIENT)
Dept: INTERNAL MEDICINE CLINIC | Age: 61
End: 2021-05-05
Payer: COMMERCIAL

## 2021-05-05 DIAGNOSIS — F33.1 MODERATE EPISODE OF RECURRENT MAJOR DEPRESSIVE DISORDER (HCC): ICD-10-CM

## 2021-05-05 PROBLEM — F32.A DEPRESSION: Status: ACTIVE | Noted: 2021-05-05

## 2021-05-05 PROCEDURE — 99213 OFFICE O/P EST LOW 20 MIN: CPT | Performed by: INTERNAL MEDICINE

## 2021-05-05 RX ORDER — OMEGA-3/DHA/EPA/FISH OIL 500-1000MG
500 CAPSULE ORAL DAILY
Qty: 60 CAPSULE | Refills: 1 | Status: SHIPPED | OUTPATIENT
Start: 2021-05-05 | End: 2021-07-06

## 2021-05-05 ASSESSMENT — PATIENT HEALTH QUESTIONNAIRE - PHQ9
7. TROUBLE CONCENTRATING ON THINGS, SUCH AS READING THE NEWSPAPER OR WATCHING TELEVISION: 0
SUM OF ALL RESPONSES TO PHQ QUESTIONS 1-9: 1
3. TROUBLE FALLING OR STAYING ASLEEP: 0
5. POOR APPETITE OR OVEREATING: 0
1. LITTLE INTEREST OR PLEASURE IN DOING THINGS: 1
SUM OF ALL RESPONSES TO PHQ9 QUESTIONS 1 & 2: 1

## 2021-05-05 NOTE — PROGRESS NOTES
08/24/2012    Normal, Erika Couch repeat in 10 years    KNEE ARTHROSCOPY  8/1/2012    LEFT;CHONDROPLASTY,SYNOVECTOMY    MENISCECTOMY  2013    Meniscus repair       Social History     Socioeconomic History    Marital status:      Spouse name: Not on file    Number of children: 3    Years of education: Not on file    Highest education level: Not on file   Occupational History     Employer: [employer] resource strain: Not hard at all   [employer] insecurity     Worry: Never true     Inability: Never true   [employer] needs     Medical: No     Non-medical: No   Tobacco Use    Smoking status: Never Smoker    Smokeless tobacco: Never Used   Substance and Sexual Activity    Alcohol use: Yes     Alcohol/week: 5.0 standard drinks     Types: 2 Glasses of wine, 3 Cans of beer per week    Drug use: No    Sexual activity: Yes     Partners: Female   Lifestyle    Physical activity     Days per week: Not on file     Minutes per session: Not on file    Stress: Not on file   Relationships    Social connections     Talks on phone: Not on file     Gets together: Not on file     Attends Buddhism service: Not on file     Active member of club or organization: Not on file     Attends meetings of clubs or organizations: Not on file     Relationship status: Not on file    Intimate partner violence     Fear of current or ex partner: Not on file     Emotionally abused: Not on file     Physically abused: Not on file     Forced sexual activity: Not on file   Other Topics Concern    Not on file   Social History Narrative    Not on file        Family History   Problem Relation Age of Onset    Cancer Mother     Heart Disease Father        PE:  Patient-Reported Vitals 5/5/2021   Patient-Reported Weight 145lb   Patient-Reported Height 5 7      There were no vitals filed for this visit.      Constitutional: [x] Appears well-developed and well-nourished [x] No apparent distress      [] Abnormal- Mental status  [x] Alert and awake  [x] Oriented to person/place/time [x]Able to follow commands      Eyes:  EOM    [x]  Normal  [] Abnormal-  Sclera  [x]  Normal  [] Abnormal -         Discharge [x]  None visible  [] Abnormal -    HENT:   [x] Normocephalic, atraumatic. [] Abnormal   [x] Mouth/Throat: Mucous membranes are moist.     External Ears [x] Normal  [] Abnormal-     Neck: [x] No visualized mass     Pulmonary/Chest: [x] Respiratory effort normal.  [x] No visualized signs of difficulty breathing or respiratory distress        [] Abnormal-      Musculoskeletal:   [] Normal gait with no signs of ataxia         [x] Normal range of motion of neck        [] Abnormal-       Neurological:        [x] No Facial Asymmetry (Cranial nerve 7 motor function) (limited exam to video visit)          [x] No gaze palsy        [] Abnormal-         Skin:        [x] No significant exanthematous lesions or discoloration noted on facial skin         [] Abnormal-            Psychiatric:       [x] Normal Affect [x] No Hallucinations        [] Abnormal-       ASSESSMENT/ PLAN:  Depression  Well-controlled. PHQ-9 score 1 today  -Continue Zoloft 50 mg  -Continue fostering interests and hobbies, yoga and meditation, physical activity and time outdoors. -f/u in 3 months      No orders of the defined types were placed in this encounter.     Orders Placed This Encounter   Medications    vitamin D 25 MCG (1000 UT) CAPS     Sig: Take 1 capsule by mouth daily     Dispense:  90 capsule     Refill:  1    Omega-3 Fatty Acids (OMEGA 3 500) 500 MG CAPS     Sig: Take 500 mg by mouth daily     Dispense:  60 capsule     Refill:  1    sertraline (ZOLOFT) 50 MG tablet     Sig: Take 1 tablet by mouth daily     Dispense:  90 tablet     Refill:  1      Medications Discontinued During This Encounter   Medication Reason    vitamin D 25 MCG (1000 UT) CAPS REORDER    Omega-3 Fatty Acids (OMEGA 3 500 PO) REORDER    sertraline (ZOLOFT) 50 MG tablet REORDER        No follow-ups on file. Graciela Sullivan MD      Luis Fernando Woodard is a 64 y.o. female being evaluated by a Virtual Visit (video visit) encounter to address concerns as mentioned above. A caregiver was present when appropriate. Due to this being a TeleHealth encounter (During XYRLD-70 public health emergency), evaluation of the following organ systems was limited: Vitals/Constitutional/EENT/Resp/CV/GI//MS/Neuro/Skin/Heme-Lymph-Imm. Pursuant to the emergency declaration under the [facility], [address] [place] [address] authority and the [employer] Act, this Virtual Visit was conducted with patient's (and/or legal guardian's) consent, to reduce the patient's risk of exposure to COVID-19 and provide necessary medical care. The patient (and/or legal guardian) has also been advised to contact this office for worsening conditions or problems, and seek emergency medical treatment and/or call 911 if deemed necessary. Patient identification was verified at the start of the visit: Yes    Total time spent on this encounter: not billed by time    Services were provided through a video synchronous discussion virtually to substitute for in-person clinic visit. Patient and provider were located at their individual homes. --Graciela Sullivan MD on 5/5/2021 at 1:45 PM    An electronic signature was used to authenticate this note. This dictation was generated by voice recognition computer software. Although all attempts are made to edit the dictation for accuracy, there may be errors in the transcription that are not intended.

## 2021-05-05 NOTE — ASSESSMENT & PLAN NOTE
Well-controlled. PHQ-9 score 1 today  -Continue Zoloft 50 mg  -Continue fostering interests and hobbies, yoga and meditation, physical activity and time outdoors.    -f/u in 3 months

## 2021-07-06 ENCOUNTER — OFFICE VISIT (OUTPATIENT)
Dept: INTERNAL MEDICINE CLINIC | Age: 61
End: 2021-07-06
Payer: COMMERCIAL

## 2021-07-06 VITALS — HEIGHT: 67 IN | BODY MASS INDEX: 22.76 KG/M2 | WEIGHT: 145 LBS

## 2021-07-06 DIAGNOSIS — S83.241S TEAR OF MEDIAL MENISCUS OF RIGHT KNEE, UNSPECIFIED TEAR TYPE, UNSPECIFIED WHETHER OLD OR CURRENT TEAR, SEQUELA: ICD-10-CM

## 2021-07-06 DIAGNOSIS — F33.1 MODERATE EPISODE OF RECURRENT MAJOR DEPRESSIVE DISORDER (HCC): ICD-10-CM

## 2021-07-06 DIAGNOSIS — Z01.818 PREOP EXAM FOR INTERNAL MEDICINE: ICD-10-CM

## 2021-07-06 DIAGNOSIS — E87.5 HYPERKALEMIA: ICD-10-CM

## 2021-07-06 DIAGNOSIS — Z78.0 MENOPAUSE: ICD-10-CM

## 2021-07-06 DIAGNOSIS — R01.1 HEART MURMUR, SYSTOLIC: ICD-10-CM

## 2021-07-06 DIAGNOSIS — Z01.818 PRE-OP EVALUATION: Primary | ICD-10-CM

## 2021-07-06 PROBLEM — S83.241A TEAR OF MEDIAL MENISCUS OF RIGHT KNEE: Status: ACTIVE | Noted: 2021-07-06

## 2021-07-06 LAB
ANION GAP SERPL CALCULATED.3IONS-SCNC: 13 MMOL/L (ref 3–16)
BUN BLDV-MCNC: 15 MG/DL (ref 7–20)
CALCIUM SERPL-MCNC: 10 MG/DL (ref 8.3–10.6)
CHLORIDE BLD-SCNC: 101 MMOL/L (ref 99–110)
CO2: 28 MMOL/L (ref 21–32)
CREAT SERPL-MCNC: 0.7 MG/DL (ref 0.6–1.1)
GFR AFRICAN AMERICAN: >60
GFR NON-AFRICAN AMERICAN: >60
GLUCOSE BLD-MCNC: 97 MG/DL (ref 70–99)
POTASSIUM SERPL-SCNC: 4.6 MMOL/L (ref 3.5–5.1)
SODIUM BLD-SCNC: 142 MMOL/L (ref 136–145)

## 2021-07-06 PROCEDURE — 93000 ELECTROCARDIOGRAM COMPLETE: CPT | Performed by: INTERNAL MEDICINE

## 2021-07-06 PROCEDURE — 99214 OFFICE O/P EST MOD 30 MIN: CPT | Performed by: INTERNAL MEDICINE

## 2021-07-06 ASSESSMENT — PATIENT HEALTH QUESTIONNAIRE - PHQ9
2. FEELING DOWN, DEPRESSED OR HOPELESS: 0
SUM OF ALL RESPONSES TO PHQ QUESTIONS 1-9: 0
SUM OF ALL RESPONSES TO PHQ QUESTIONS 1-9: 0
1. LITTLE INTEREST OR PLEASURE IN DOING THINGS: 0
SUM OF ALL RESPONSES TO PHQ QUESTIONS 1-9: 0
SUM OF ALL RESPONSES TO PHQ9 QUESTIONS 1 & 2: 0

## 2021-07-06 ASSESSMENT — ENCOUNTER SYMPTOMS
DIARRHEA: 0
NAUSEA: 0
COUGH: 0
ABDOMINAL PAIN: 0
COLOR CHANGE: 0
SHORTNESS OF BREATH: 0
BLOOD IN STOOL: 0
EYE REDNESS: 0

## 2021-07-06 NOTE — PATIENT INSTRUCTIONS
Medication adjustments:   No NSAIDs (ibuprofen, aleve, advil, aspirin, motrin etc) for 1 week prior to your surgery. If you have pain, you can take tylenol 1000mg every 8 hours as needed. Food Calcium, milligrams   Milk (skim, 2 percent, or whole, 8 oz [240 mL]) 300   Yogurt (6 oz [168 g]) 250   Orange juice (with calcium, 8 oz [240 mL]) 300   Tofu with calcium (1/2 cup [783 g]) 435   Cheese (1 oz [28 g]) 195 to 335 (hard cheese = higher calcium)   Cottage cheese (1/2 cup [613 g]) 130   Ice cream or frozen yogurt (1/2 cup [335 g]) 100   Soy milk (8 oz [240 mL]) 300   Beans (1/2 cup cooked [660 g]) 60 to 80   Dark, leafy green vegetables (1/2 cup cooked [925 g]) 50 to 135   Almonds (24 whole) 70   Orange (1 medium) 60       An optimal diet for treatment or prevention of osteoporosis includes an adequate intake of calories (to avoid malnutrition), calcium, and vitamin D. Postmenopausal women who are getting adequate calcium from dietary intake alone (approximately 1200 mg daily) do not need to take calcium supplements. Women with inadequate dietary intake should take supplemental elemental calcium (generally 500 to 1000 mg/day), in divided doses at mealtime, such that their total calcium intake (diet plus supplements) approximates 1200 mg/day. Women should also ingest a total of 800 international units of vitamin D daily.

## 2021-07-06 NOTE — ASSESSMENT & PLAN NOTE
Jagjit Locke first in 2015, echo showed no valvular pathology, denies any symptoms, other than murmur unremarkable exam again. physically active without limitation or symptoms  -Continue to monitor clinically for now.  No indication to repeat echo prior to surgery

## 2021-07-06 NOTE — ASSESSMENT & PLAN NOTE
Here for preoperative consultation. Patient's revised cardiac risk stratification is 0 points. ASA class I. No current complaints to suggest active cardiac heart disease. No indication for further cardiac work up and can proceed with surgery at this time. - low risk for NATTY.    - EKG done and reviewed today, sinus zaria no acute ischemic changes   - BMP given hx of hyperK  - Pt was instructed to not take ASA 10 days prior to surgery, and not take pain medication from the NSAIDs family 7 days prior to surgery

## 2021-07-06 NOTE — PROGRESS NOTES
[facility] Internal Medicine  Preoperative visit   7/6/2021    Patient is here for preop evaluation at the request of Dr. Shawna Jimenes for rt knee scope partial medial menisectomy . Is scheduled for surgery on 7/23/21    Functional Assessment:  Exercise tolerance: 9.89 METS using the DASI calculator   Denies any current chest pain or discomfort, sob or HARRISON, orthopnea, PND or leg swelling. Medications: reviewed, Over the counter (NSAIDs) use: no    Cardiac Risk:  High-risk Surgery: n / Hx of ischemic heart disease: n / Hx of CHF:n / Hx of CVA:n / Pre-op insulin: n / Pre-op creatinine >2.0: n (last cr 2018 )    NATTY Screen:  Snore loudly? y /  Feel tired/fatigued during the day? n  /  Stop breathing while sleeping? n / High blood pressure? n / Morning HA? n    History of surgery/ anesthesia:  - No hx of complications, anesthesia reaction, bleeding, bruising, clots  - No family Hx of reactions to anesthesia/ bleeding/clots  - No  Loose teeth/ dentures      - Support systems after surgery  alyson  - Smoking/ alcohol/drugs no  - Complicating medical diseases are currently well controlled.        Problem List  Patient Active Problem List   Diagnosis    Vitamin D deficiency    Menopause    Fibrocystic breast disease    Heart murmur, systolic    S/P left knee arthroscopy, chondroplasty, and synovectomy with medial meniscus fraying 8/01/2012    Primary osteoarthritis of left knee    Chondromalacia patellae of left knee    Depression    Preop exam for internal medicine    Tear of medial meniscus of right knee       Medical and Surgical History  Past Medical History:   Diagnosis Date    ACL (anterior cruciate ligament) tear 9/10/2013    Fibrocystic breast disease 10/3/2014    Heart murmur, systolic 48/6/8624    Iron deficiency anemia 7/13/2012    Meniscus tear 7/9/2012    Menopause 12/5/2012    Unspecified vitamin D deficiency 7/12/2012     Past Surgical History:   Procedure Laterality Date    COLONOSCOPY  08/24/2012    Normal, [place] repeat in 10 years    KNEE ARTHROSCOPY  8/1/2012    LEFT;CHONDROPLASTY,SYNOVECTOMY    MENISCECTOMY  2013    Meniscus repair       Medications  Prior to Visit Medications    Medication Sig Taking? Authorizing Provider   sertraline (ZOLOFT) 50 MG tablet Take 1 tablet by mouth daily Yes Manjinder Shukla MD   vitamin D 25 MCG (1000 UT) CAPS Take 1 capsule by mouth daily Yes Manjinder Shukla MD        Allergies  No Known Allergies    Social History  Social History     Tobacco Use    Smoking status: Never Smoker    Smokeless tobacco: Never Used   Substance Use Topics    Alcohol use: Yes     Alcohol/week: 5.0 standard drinks     Types: 2 Glasses of wine, 3 Cans of beer per week   ,   Social History     Substance and Sexual Activity   Drug Use No       ROS  Review of Systems   Constitutional: Negative for chills, fever and unexpected weight change. Eyes: Negative for redness. Respiratory: Negative for cough and shortness of breath. Cardiovascular: Negative for chest pain and leg swelling. Gastrointestinal: Negative for abdominal pain, blood in stool, diarrhea and nausea. Genitourinary: Negative for dysuria and hematuria. Musculoskeletal: Positive for arthralgias. Negative for gait problem. Skin: Negative for color change. Neurological: Negative for weakness. Psychiatric/Behavioral: Negative for confusion and dysphoric mood. Physical Exam  Vitals:    07/06/21 1440   Weight: 145 lb (65.8 kg)   Height: 5' 7\" (1.702 m)     Estimated body mass index is 22.71 kg/m² as calculated from the following:    Height as of this encounter: 5' 7\" (1.702 m). Weight as of this encounter: 145 lb (65.8 kg). Physical Exam  Vitals reviewed. Constitutional:       General: She is not in acute distress. Appearance: Normal appearance. She is well-developed. She is not ill-appearing, toxic-appearing or diaphoretic. HENT:      Head: Normocephalic and atraumatic.    Eyes: General: No scleral icterus. Conjunctiva/sclera: Conjunctivae normal.      Pupils: Pupils are equal, round, and reactive to light. Cardiovascular:      Rate and Rhythm: Normal rate and regular rhythm. Heart sounds: Murmur heard. Pulmonary:      Effort: Pulmonary effort is normal. No respiratory distress. Breath sounds: Normal breath sounds. Abdominal:      General: There is no distension. Palpations: Abdomen is soft. Tenderness: There is no abdominal tenderness. Musculoskeletal:         General: No swelling or deformity. Normal range of motion. Cervical back: Normal range of motion and neck supple. Right lower leg: No edema. Left lower leg: No edema. Skin:     General: Skin is warm and dry. Coloration: Skin is not jaundiced. Neurological:      Mental Status: She is alert and oriented to person, place, and time. Psychiatric:         Behavior: Behavior normal.         Labs and Studies  Lab Results   Component Value Date     01/05/2018    K 5.2 (H) 01/05/2018    CL 99 01/05/2018    CO2 25 01/05/2018    BUN 12 01/05/2018    CREATININE 0.6 01/05/2018    GLUCOSE 89 01/05/2018    CALCIUM 9.6 01/05/2018    LABALBU 4.5 01/05/2018     Lab Results   Component Value Date    WBC 4.4 01/05/2018    HGB 13.9 01/05/2018    HCT 41.6 01/05/2018    MCV 93.5 01/05/2018     01/05/2018     No components found for: HGBA1C      Assessment and Plan   Preop exam for internal medicine  Here for preoperative consultation. Patient's revised cardiac risk stratification is 0 points. ASA class I. No current complaints to suggest active cardiac heart disease. No indication for further cardiac work up and can proceed with surgery at this time. - low risk for NATTY.    - EKG done and reviewed today, sinus zaria no acute ischemic changes   - BMP given hx of hyperK  - Pt was instructed to not take ASA 10 days prior to surgery, and not take pain medication from the NSAIDs family 7 days prior to surgery    Tear of medial meniscus of right knee  For surgery    Depression  Well-controlled  -Continue Zoloft 50 mg  -Continue fostering interest in hobbies, yoga and meditation, physical activity and time outdoors    Heart murmur, systolic  [place] first in 1879, echo showed no valvular pathology, denies any symptoms, other than murmur unremarkable exam again. physically active without limitation or symptoms  -Continue to monitor clinically for now. No indication to repeat echo prior to surgery    Menopause  -take at least 800u vit D daily, calcium to aim for 1200 mg from diet/supplements        Orders Placed This Encounter   Procedures    Basic Metabolic Panel    EKG 12 Lead           ASA CLASS:  ASA I A normal healthy patient Healthy, non-smoking, no or minimal alcohol use   ASA II A patient with mild systemic disease Mild diseases only without substantive functional limitations. Examples include (but not limited to): current smoker, social alcohol drinker, pregnancy, obesity (30 < BMI < 40), well-controlled DM/HTN, mild lung disease   ASA III A patient with severe systemic disease Substantive functional limitations; One or more moderate to severe diseases. Examples include (but not limited to): poorly controlled DM or HTN, COPD, morbid obesity (BMI ?40), active hepatitis, alcohol dependence or abuse, implanted pacemaker, moderate reduction of ejection fraction, ESRD undergoing regularly scheduled dialysis, premature infant PCA < 60 weeks, history (>3 months) of MI, CVA, TIA, or CAD/stents.    ASA IV A patient with severe systemic disease that is a constant threat to life Examples include (but not limited to): recent ( < 3 months) MI, CVA, TIA, or CAD/stents, ongoing cardiac ischemia or severe valve dysfunction, severe reduction of ejection fraction, sepsis, DIC, BHARGAV or ESRD not undergoing regularly scheduled dialysis   ASA V A moribund patient who is not expected to survive without the operation Examples include (but not limited to): ruptured abdominal/thoracic aneurysm, massive trauma, intracranial bleed with mass effect, ischemic bowel in the face of significant cardiac pathology or multiple organ/system dysfunction   ASA VI A declared brain-dead patient whose organs are being removed for donor purposes       RCRI Score Risk of major cardiac event*   0- class I 3.9%   1- class II 6%   2- class III 10.1%   ? 3- class IV 15%   *Defined as myocardial infarction, pulmonary edema, ventricular fibrillation/primary cardiac arrest, or complete heart block. Ama Gardiner MD     This dictation was generated by voice recognition computer software. Although all attempts are made to edit the dictation for accuracy, there may be errors in the transcription that are not intended.

## 2021-07-06 NOTE — ASSESSMENT & PLAN NOTE
Well-controlled  -Continue Zoloft 50 mg  -Continue fostering interest in hobbies, yoga and meditation, physical activity and time outdoors

## 2021-07-19 ENCOUNTER — TELEPHONE (OUTPATIENT)
Dept: INTERNAL MEDICINE CLINIC | Age: 61
End: 2021-07-19

## 2021-07-20 NOTE — TELEPHONE ENCOUNTER
[facility] is requesting a BMP Lab from when the patient had a pre-op.       Please fax    Serenity Patino  W:[phone]

## 2021-08-05 PROBLEM — Z01.818 PREOP EXAM FOR INTERNAL MEDICINE: Status: RESOLVED | Noted: 2021-07-06 | Resolved: 2021-08-05

## 2021-10-11 ENCOUNTER — OFFICE VISIT (OUTPATIENT)
Dept: INTERNAL MEDICINE CLINIC | Age: 61
End: 2021-10-11
Payer: COMMERCIAL

## 2021-10-11 VITALS
WEIGHT: 146.4 LBS | HEIGHT: 67 IN | OXYGEN SATURATION: 98 % | BODY MASS INDEX: 22.98 KG/M2 | SYSTOLIC BLOOD PRESSURE: 128 MMHG | TEMPERATURE: 97.9 F | DIASTOLIC BLOOD PRESSURE: 80 MMHG | HEART RATE: 64 BPM

## 2021-10-11 DIAGNOSIS — Z13.1 DIABETES MELLITUS SCREENING: ICD-10-CM

## 2021-10-11 DIAGNOSIS — Z00.00 ANNUAL PHYSICAL EXAM: Primary | ICD-10-CM

## 2021-10-11 DIAGNOSIS — Z12.11 COLON CANCER SCREENING: ICD-10-CM

## 2021-10-11 DIAGNOSIS — R41.3 MEMORY CHANGES: ICD-10-CM

## 2021-10-11 DIAGNOSIS — E55.9 VITAMIN D DEFICIENCY: ICD-10-CM

## 2021-10-11 DIAGNOSIS — Z13.220 LIPID SCREENING: ICD-10-CM

## 2021-10-11 DIAGNOSIS — F33.1 MODERATE EPISODE OF RECURRENT MAJOR DEPRESSIVE DISORDER (HCC): ICD-10-CM

## 2021-10-11 PROBLEM — Z98.890 S/P LEFT KNEE ARTHROSCOPY: Status: RESOLVED | Noted: 2017-04-19 | Resolved: 2021-10-11

## 2021-10-11 PROBLEM — M22.42 CHONDROMALACIA PATELLAE OF LEFT KNEE: Status: RESOLVED | Noted: 2018-03-01 | Resolved: 2021-10-11

## 2021-10-11 PROBLEM — S83.241A TEAR OF MEDIAL MENISCUS OF RIGHT KNEE: Status: RESOLVED | Noted: 2021-07-06 | Resolved: 2021-10-11

## 2021-10-11 PROCEDURE — 99396 PREV VISIT EST AGE 40-64: CPT | Performed by: INTERNAL MEDICINE

## 2021-10-11 SDOH — ECONOMIC STABILITY: FOOD INSECURITY: WITHIN THE PAST 12 MONTHS, THE FOOD YOU BOUGHT JUST DIDN'T LAST AND YOU DIDN'T HAVE MONEY TO GET MORE.: NEVER TRUE

## 2021-10-11 SDOH — ECONOMIC STABILITY: FOOD INSECURITY: WITHIN THE PAST 12 MONTHS, YOU WORRIED THAT YOUR FOOD WOULD RUN OUT BEFORE YOU GOT MONEY TO BUY MORE.: NEVER TRUE

## 2021-10-11 ASSESSMENT — SOCIAL DETERMINANTS OF HEALTH (SDOH): HOW HARD IS IT FOR YOU TO PAY FOR THE VERY BASICS LIKE FOOD, HOUSING, MEDICAL CARE, AND HEATING?: NOT HARD AT ALL

## 2021-10-11 ASSESSMENT — ENCOUNTER SYMPTOMS
NAUSEA: 0
COLOR CHANGE: 0
COUGH: 0
ABDOMINAL PAIN: 0
BLOOD IN STOOL: 0
EYE REDNESS: 0
DIARRHEA: 0
SHORTNESS OF BREATH: 0

## 2021-10-11 NOTE — PROGRESS NOTES
[facility]  Preventive medicine/ physical exam visit   10/11/2021    Elías Garza (:  1960) [place] 63 y.o. female, here for a preventive medicine evaluation. Patient Active Problem List   Diagnosis    Vitamin D deficiency    Menopause    Annual physical exam    Fibrocystic breast disease    Heart murmur, systolic    Primary osteoarthritis of left knee    Depression    Memory changes       HPI  Here for annual physical exam, overall doing well and has no concerns. Chronic conditions and Medications reviewed and updated today. Social and Lifestyle reviewed and updated today. - Exercise: Regularly meditation, yoga, hiking  - Diet: Well-balanced  Other providers-eye, Derm    Screening:    Cancer:   - Cervical cancer: Pap smear 2018 was negative, HPV not tested, due.   - Breast cancer: History of abnormal mammography in , biopsy was benign. Gets yearly mammograms, last 2021 benign.  mother had breast tumor removed, likely benign.   - Colon cancer: Normal colonoscopy in , due  no FH of colon cancer.     - lung cancer- not indicated  Other screening:   - Hypertension: BP WNL today    - Diabetes mellitus/ Hyperlipidemia:   Lab Results   Component Value Date    CHOL 252 10/07/2020    CHOL 223 2018    CHOL 208 2016    TRIG 45 10/07/2020    TRIG 43 2018    TRIG 48 2016     10/07/2020     2018     2016    LDLCALC 120 10/07/2020    LDLCALC 84 2018    LDLCALC 92 2016    GLUCOSE 97 2021    GLUCOSE 89 2018    GLUCOSE 96 2016    LABA1C 5.4 10/07/2020   - HIV, STD: No results found for: RPR, No results found for: HIV1X2 - HCV: neg   - Domestic violence: pt denies   - Depression:  PHQ2 screen-  neg  - Dementia: noted some memory changes lately  - Vision/ hearing: no concerns   - Dentist: twice a year     Immunizations:   Immunization History   Administered Date(s) Administered   [facility] COVID-19, Moderna, PF, 100mcg/0.5mL 03/16/2021, 04/13/2021    Influenza 09/28/2015    Influenza Vaccine, unspecified formulation 09/28/2015, 09/30/2016, 11/03/2017    Influenza Virus Vaccine 09/22/2020    Influenza, [place] Hoda, IM, PF (6 mo and older Fluzone, Flulaval, Fluarix, and 3 yrs and older Afluria) 09/09/2019, 09/22/2020    Tdap (Boostrix, Adacel) 07/09/2012    Zoster Recombinant (Shingrix) 02/09/2020, 07/07/2020         ROS  Review of Systems   Constitutional: Negative for chills, fever and unexpected weight change. Eyes: Negative for redness. Respiratory: Negative for cough and shortness of breath. Cardiovascular: Negative for chest pain and leg swelling. Gastrointestinal: Negative for abdominal pain, blood in stool, diarrhea and nausea. Genitourinary: Negative for dysuria and hematuria. Musculoskeletal: Negative for gait problem. Skin: Negative for color change. Neurological: Negative for dizziness, weakness, numbness and headaches. Psychiatric/Behavioral: Negative for agitation and confusion. Memory changes? HISTORIES  Current Outpatient Medications on File Prior to Visit   Medication Sig Dispense Refill    vitamin D 25 MCG (1000 UT) CAPS Take 1 capsule by mouth daily 90 capsule 1     No current facility-administered medications on file prior to visit.         No Known Allergies    Past Medical History:   Diagnosis Date    ACL (anterior cruciate ligament) tear 9/10/2013    Fibrocystic breast disease 10/3/2014    Heart murmur, systolic 68/4/2728    Iron deficiency anemia 7/13/2012    Meniscus tear 7/9/2012    Menopause 12/5/2012    S/P left knee arthroscopy, chondroplasty, and synovectomy with medial meniscus fraying 8/01/2012 4/19/2017    Tear of medial meniscus of right knee 7/6/2021    Unspecified vitamin D deficiency 7/12/2012       Patient Active Problem List   Diagnosis    Vitamin D deficiency    Menopause    Annual physical exam    Fibrocystic breast disease    Heart murmur, systolic    Primary osteoarthritis of left knee    Depression    Memory changes       Past Surgical History:   Procedure Laterality Date    COLONOSCOPY  08/24/2012    Normal, Fredricka Aschoff repeat in 10 years    KNEE ARTHROSCOPY  8/1/2012    LEFT;CHONDROPLASTY,SYNOVECTOMY    MENISCECTOMY  2013    Meniscus repair       OB History    No obstetric history on file. Social History     Socioeconomic History    Marital status:      Spouse name: Not on file    Number of children: 3    Years of education: Not on file    Highest education level: Not on file   Occupational History     Employer: [employer]   Tobacco Use    Smoking status: Never Smoker    Smokeless tobacco: Never Used   Substance and Sexual Activity    Alcohol use: Yes     Alcohol/week: 5.0 standard drinks     Types: 2 Glasses of wine, 3 Cans of beer per week    Drug use: No    Sexual activity: Yes     Partners: Female   Other Topics Concern    Not on file   Social History Narrative    Not on file     Social Determinants of Health     Financial Resource Strain: Low Risk     Difficulty of Paying Living Expenses: Not hard at all   Food Insecurity: No Food Insecurity    Worried About [address] in the Last Year: Never true    [facility] in the Last Year: Never true   Transportation Needs:     Lack of Transportation (Medical):      Lack of Transportation (Non-Medical):    Physical Activity:     Days of Exercise per Week:     Minutes of Exercise per Session:    Stress:     Feeling of Stress :    Social Connections:     Frequency of Communication with Friends and Family:     Frequency of Social Gatherings with Friends and Family:     Attends Mandaen Services:     Active Member of Clubs or Organizations:     Attends Club or Organization Meetings:     Marital Status:    Intimate Partner Violence:     Fear of Current or Ex-Partner:     Emotionally Abused:     Physically Abused:     Sexually Abused:         Family History   Problem Relation Age of Onset    Cancer Mother     Heart Disease Father        Advanced directive: N, <no information>    PE  Vitals:    10/11/21 0808   BP: 128/80   Pulse: 64   Temp: 97.9 °F (36.6 °C)   SpO2: 98%   Weight: 146 lb 6.4 oz (66.4 kg)   Height: 5' 7\" (1.702 m)     Estimated body mass index is 22.93 kg/m² as calculated from the following:    Height as of this encounter: 5' 7\" (1.702 m). Weight as of this encounter: 146 lb 6.4 oz (66.4 kg). Physical Exam  Vitals reviewed. Constitutional:       General: She is not in acute distress. Appearance: Normal appearance. She is well-developed. She is not ill-appearing, toxic-appearing or diaphoretic. HENT:      Head: Normocephalic and atraumatic. Right Ear: Tympanic membrane normal.      Left Ear: Tympanic membrane normal.   Eyes:      General: No scleral icterus. Conjunctiva/sclera: Conjunctivae normal.      Pupils: Pupils are equal, round, and reactive to light. Cardiovascular:      Rate and Rhythm: Normal rate and regular rhythm. Heart sounds: Normal heart sounds. Pulmonary:      Effort: Pulmonary effort is normal. No respiratory distress. Breath sounds: Normal breath sounds. Abdominal:      General: There is no distension. Palpations: Abdomen is soft. Tenderness: There is no abdominal tenderness. Musculoskeletal:      Cervical back: Normal range of motion and neck supple. Right lower leg: No edema. Left lower leg: No edema. Skin:     General: Skin is warm and dry. Coloration: Skin is not jaundiced. Neurological:      Mental Status: She is alert and oriented to person, place, and time. Psychiatric:         Behavior: Behavior normal.         The ASCVD Risk score (Ryan Yu, et al., 2013) failed to calculate for the following reasons:     The valid HDL cholesterol range is 20 to 100 mg/dL    Immunization History   Administered Date(s) Administered  COVID-19, Moderna, PF, 100mcg/0.5mL 03/16/2021, 04/13/2021    Influenza 09/28/2015    Influenza Vaccine, unspecified formulation 09/28/2015, 09/30/2016, 11/03/2017    Influenza Virus Vaccine 09/22/2020    Influenza, Brian [place]ian, IM, PF (6 mo and older Fluzone, Flulaval, Fluarix, and 3 yrs and older Afluria) 09/09/2019, 09/22/2020    Tdap (Boostrix, Adacel) 07/09/2012    Zoster Recombinant (Shingrix) 02/09/2020, 07/07/2020       Health Maintenance   Topic Date Due    Cervical cancer screen  01/03/2021    DTaP/Tdap/Td vaccine (2 - Td or Tdap) 07/09/2022    Colon cancer screen colonoscopy  08/24/2022    Breast cancer screen  02/01/2023    Lipid screen  10/07/2025    Flu vaccine  Completed    Shingles Vaccine  Completed    COVID-19 Vaccine  Completed    Hepatitis C screen  Completed    HIV screen  Completed    Hepatitis A vaccine  Aged Out    Hepatitis B vaccine  Aged Out    Hib vaccine  Aged Out    Meningococcal (ACWY) vaccine  Aged Out    Pneumococcal 0-64 years Vaccine  Aged Out       ASSESSMENT/ PLAN:  Annual physical exam  Here for annual physical exam, overall doing well from a clinical standpoint and has no concerns. As for health maintenance:  -Normal Pap without HPV 2018, due this year, will schedule with me  -History of abnormal mammography in 2016, benign biopsy, gets yearly mammograms. Mother had likely benign breast tumor.  Last mammo negative 02/2021  -Normal colonoscopy in 2012, due 2022, referred  -Not indicated for lung cancer screening  -BP WNL today  -A1c, lipid panel ordered  -HIV and HCV negative (2019)  -Negative depression and DV screen today  -Up-to-date with vaccines   -Advised to eat a healthy, well-balanced diet  -Advised to exercise at least 30min/day 5x/week for heart and bone health  -Follow up with dentist at least twice/year.  -Follow up with eye doctor at least once/year.  -Calcium goal is 4223-5054 mg a day ideally from diet (dairy, green leafy vegetables, nuts), continue vit D 1000 unit     Depression  Well controlled  -Continue Zoloft 50 mg  -Continue regular physical activity yoga, meditation, outdoor times.  -Overall feeling very well with life stressors.  is dealing with uncontrolled ET and some DBS complications which puts significant strain on their relationship and her individually. She is very optimistic and positive and able to both help support her  and make sure she is not letting go of her own interests and motivations. Memory changes  Noted some memory changes lately  -Start with checking B12, TSH  -She will come in for a formal [place] evaluation        Orders Placed This Encounter   Procedures    Lipid Panel    Hemoglobin A1C    VITAMIN B12 & FOLATE    TSH WITH REFLEX TO FT4    Amb External Referral To Gastroenterology      Orders Placed This Encounter   Medications    sertraline (ZOLOFT) 50 MG tablet     Sig: Take 1 tablet by mouth daily     Dispense:  90 tablet     Refill:  1      Medications Discontinued During This Encounter   Medication Reason    sertraline (ZOLOFT) 50 MG tablet REORDER      Return in about 4 weeks (around 11/8/2021) for [place].     Mauyr Reaves MD

## 2021-10-11 NOTE — PATIENT INSTRUCTIONS
Food Calcium, milligrams   Milk (skim, 2 percent, or whole, 8 oz [240 mL]) 300   Yogurt (6 oz [168 g]) 250   Orange juice (with calcium, 8 oz [240 mL]) 300   Tofu with calcium (1/2 cup [760 g]) 435   Cheese (1 oz [28 g]) 195 to 335 (hard cheese = higher calcium)   Cottage cheese (1/2 cup [762 g]) 130   Ice cream or frozen yogurt (1/2 cup [926 g]) 100   Soy milk (8 oz [240 mL]) 300   Beans (1/2 cup cooked [686 g]) 60 to 80   Dark, leafy green vegetables (1/2 cup cooked [466 g]) 50 to 135   Almonds (24 whole) 70   Orange (1 medium) 60       An optimal diet for treatment or prevention of osteoporosis includes an adequate intake of calories (to avoid malnutrition), calcium, and vitamin D. Postmenopausal women who are getting adequate calcium from dietary intake alone (approximately 1200 mg daily) do not need to take calcium supplements. Women with inadequate dietary intake should take supplemental elemental calcium (generally 500 to 1000 mg/day), in divided doses at mealtime, such that their total calcium intake (diet plus supplements) approximates 1200 mg/day. Women should also ingest a total of 800 international units of vitamin D daily.       You will be due for colonoscopy 2022  Schedule a pap smear with me  Schedule memory testing \"moca\"

## 2021-10-11 NOTE — ASSESSMENT & PLAN NOTE
Here for annual physical exam, overall doing well from a clinical standpoint and has no concerns. As for health maintenance:  -Normal Pap without HPV 2018, due this year, will schedule with me  -History of abnormal mammography in 2016, benign biopsy, gets yearly mammograms. Mother had likely benign breast tumor.  Last mammo negative 02/2021  -Normal colonoscopy in 2012, due 2022, referred  -Not indicated for lung cancer screening  -BP WNL today  -A1c, lipid panel ordered  -HIV and HCV negative (2019)  -Negative depression and DV screen today  -Up-to-date with vaccines   -Advised to eat a healthy, well-balanced diet  -Advised to exercise at least 30min/day 5x/week for heart and bone health  -Follow up with dentist at least twice/year.  -Follow up with eye doctor at least once/year.  -Calcium goal is 5730-3059 mg a day ideally from diet (dairy, green leafy vegetables, nuts), continue vit D 1000 unit

## 2021-10-11 NOTE — ASSESSMENT & PLAN NOTE
Noted some memory changes lately  -Start with checking B12, TSH  -She will come in for a formal [place] evaluation

## 2021-10-11 NOTE — ASSESSMENT & PLAN NOTE
Well controlled  -Continue Zoloft 50 mg  -Continue regular physical activity yoga, meditation, outdoor times.  -Overall feeling very well with life stressors.  is dealing with uncontrolled ET and some DBS complications which puts significant strain on their relationship and her individually. She is very optimistic and positive and able to both help support her  and make sure she is not letting go of her own interests and motivations.

## 2021-11-10 ENCOUNTER — OFFICE VISIT (OUTPATIENT)
Dept: INTERNAL MEDICINE CLINIC | Age: 61
End: 2021-11-10
Payer: COMMERCIAL

## 2021-11-10 VITALS
BODY MASS INDEX: 23.39 KG/M2 | HEART RATE: 61 BPM | WEIGHT: 149 LBS | HEIGHT: 67 IN | DIASTOLIC BLOOD PRESSURE: 68 MMHG | TEMPERATURE: 97.9 F | SYSTOLIC BLOOD PRESSURE: 112 MMHG | OXYGEN SATURATION: 98 %

## 2021-11-10 DIAGNOSIS — R41.3 MEMORY CHANGES: ICD-10-CM

## 2021-11-10 DIAGNOSIS — F33.1 MODERATE EPISODE OF RECURRENT MAJOR DEPRESSIVE DISORDER (HCC): ICD-10-CM

## 2021-11-10 PROCEDURE — 99214 OFFICE O/P EST MOD 30 MIN: CPT | Performed by: INTERNAL MEDICINE

## 2021-11-10 ASSESSMENT — PATIENT HEALTH QUESTIONNAIRE - PHQ9
1. LITTLE INTEREST OR PLEASURE IN DOING THINGS: 0
SUM OF ALL RESPONSES TO PHQ QUESTIONS 1-9: 0
SUM OF ALL RESPONSES TO PHQ QUESTIONS 1-9: 0
2. FEELING DOWN, DEPRESSED OR HOPELESS: 0
SUM OF ALL RESPONSES TO PHQ9 QUESTIONS 1 & 2: 0
SUM OF ALL RESPONSES TO PHQ QUESTIONS 1-9: 0

## 2021-11-10 NOTE — PROGRESS NOTES
[facility]  Follow up visit   11/10/2021    Jaxon Díaz (:  1960) is a 63 y.o. female, here for evaluation of the following medical concerns:    Chief Complaint   Patient presents with    Follow-up     MOCA        HPI  Here for MoCA testing, score 29/30 (lost 1 point for attention)  Some home stressors, feels depression can be better controlled. Denies balance issues, urine incontinence. No getting lost.  Independent of ADLs and IADLs. Exercises regularly very active mentally and physically    ROS  Review of Systems   Constitutional: Negative for chills, fever and unexpected weight change. Psychiatric/Behavioral: Negative for confusion, decreased concentration, dysphoric mood and sleep disturbance. The patient is not nervous/anxious. HISTORIES   Current Outpatient Medications on File Prior to Visit   Medication Sig Dispense Refill    vitamin D 25 MCG (1000 UT) CAPS Take 1 capsule by mouth daily 90 capsule 1     No current facility-administered medications on file prior to visit.        No Known Allergies    Past Medical History:   Diagnosis Date    ACL (anterior cruciate ligament) tear 9/10/2013    Fibrocystic breast disease 10/3/2014    Heart murmur, systolic 2241    Iron deficiency anemia 2012    Meniscus tear 2012    Menopause 2012    S/P left knee arthroscopy, chondroplasty, and synovectomy with medial meniscus fraying 2012    Tear of medial meniscus of right knee 2021    Unspecified vitamin D deficiency 2012       Patient Active Problem List   Diagnosis    Vitamin D deficiency    Menopause    Annual physical exam    Fibrocystic breast disease    Heart murmur, systolic    Primary osteoarthritis of left knee    Depression    Memory changes       Past Surgical History:   Procedure Laterality Date    COLONOSCOPY  2012    Normal, Lasalle Olszewski repeat in 10 years    KNEE ARTHROSCOPY  2012 LEFT;CHONDROPLASTY,SYNOVECTOMY    MENISCECTOMY  2013    Meniscus repair       Social History     Socioeconomic History    Marital status:      Spouse name: Not on file    Number of children: 3    Years of education: Not on file    Highest education level: Not on file   Occupational History     Employer: [employer]   Tobacco Use    Smoking status: Never Smoker    Smokeless tobacco: Never Used    Tobacco comment: Do not Use   Substance and Sexual Activity    Alcohol use: Yes     Alcohol/week: 4.0 standard drinks     Types: 4 Glasses of wine per week    Drug use: No    Sexual activity: Yes     Partners: Female   Other Topics Concern    Not on file   Social History Narrative    Not on file     Social Determinants of Health     Financial Resource Strain: Low Risk     Difficulty of Paying Living Expenses: Not hard at all   Food Insecurity: No Food Insecurity    Worried About [address] in the Last Year: Never true    [address] [employer] in the Last Year: Never true   Transportation Needs:     Lack of Transportation (Medical): Not on file    Lack of Transportation (Non-Medical):  Not on file   Physical Activity:     Days of Exercise per Week: Not on file    Minutes of Exercise per Session: Not on file   Stress:     Feeling of Stress : Not on file   Social Connections:     Frequency of Communication with Friends and Family: Not on file    Frequency of Social Gatherings with Friends and Family: Not on file    Attends Oriental orthodox Services: Not on file    Active Member of Clubs or Organizations: Not on file    Attends Club or Organization Meetings: Not on file    Marital Status: Not on file   Intimate Partner Violence:     Fear of Current or Ex-Partner: Not on file    Emotionally Abused: Not on file    Physically Abused: Not on file    Sexually Abused: Not on file   Housing Stability:     Unable to Pay for Housing in the Last Year: Not on file    Number of [place] in the Last Year: Not on file    Unstable Housing in the Last Year: Not on file        Family History   Problem Relation Age of Onset    Cancer Mother     Heart Disease Father        PE  Vitals:    11/10/21 0839   BP: 112/68   Site: Left Upper Arm   Position: Sitting   Cuff Size: Medium Adult   Pulse: 61   Temp: 97.9 °F (36.6 °C)   TempSrc: Infrared   SpO2: 98%   Weight: 149 lb (67.6 kg)   Height: 5' 7\" (1.702 m)     Estimated body mass index is 23.34 kg/m² as calculated from the following:    Height as of this encounter: 5' 7\" (1.702 m). Weight as of this encounter: 149 lb (67.6 kg). Physical Exam  Vitals reviewed. Constitutional:       General: She is not in acute distress. Appearance: Normal appearance. She is well-developed. She is not ill-appearing, toxic-appearing or diaphoretic. HENT:      Head: Normocephalic and atraumatic. Eyes:      General: No scleral icterus. Conjunctiva/sclera: Conjunctivae normal.      Pupils: Pupils are equal, round, and reactive to light. Cardiovascular:      Rate and Rhythm: Normal rate and regular rhythm. Heart sounds: Normal heart sounds. Pulmonary:      Effort: Pulmonary effort is normal. No respiratory distress. Musculoskeletal:      Cervical back: Normal range of motion and neck supple. Right lower leg: No edema. Left lower leg: No edema. Skin:     General: Skin is warm and dry. Coloration: Skin is not jaundiced. Neurological:      General: No focal deficit present. Mental Status: She is alert and oriented to person, place, and time. Cranial Nerves: No cranial nerve deficit. Sensory: No sensory deficit. Motor: No weakness.       Coordination: Coordination normal.      Gait: Gait normal.      Deep Tendon Reflexes: Reflexes normal.      Comments: Normal vibration  No cogwheel rigidity    Psychiatric:         Behavior: Behavior normal.         ASSESSMENT/ PLAN:    Memory changes  Last visit noted some memory changes lately for which she came back for MoCA testing today. MoCA is normal 29/30, losing 1 point for attention. TSH normal, B12 normal (lower end). History and exam are not suggestive of parkinsonism's features, NPH. Highly educated, physically and mentally active. At this point I believe changes noticed by her are more related to home stressors ( is dealing with health issues) and depression than objective cognitive decline.   -MoCA scanned and will be used as baseline moving forward. If any further cognitive changes noted by her, family, friends will let me know for further evaluation.  -Depression management as below  -can add multivitamin/b12 supplement though not clearly deficient   -Continue general lifestyle interventions to preserve/improve cognition: exercise, healthy diet, regular social activities, optimal sleep, preserving over learned behaviors.   -tight BP control had been shown to help lower the risk of progression to dementia    Depression  She feels can be better controlled, potentially contributing to subjective noted memory change  -increase Zoloft to 75 mg  -Continue regular physical activity yoga, meditation, outdoor times.  - is dealing with uncontrolled ET and some DBS complications which puts significant strain on their relationship and her individually. She is very optimistic and positive and able to both help support her  and make sure she is not letting go of her own interests and motivations. No orders of the defined types were placed in this encounter. Orders Placed This Encounter   Medications    sertraline (ZOLOFT) 50 MG tablet     Sig: Take 1.5 tablets by mouth daily     Dispense:  135 tablet     Refill:  1      Medications Discontinued During This Encounter   Medication Reason    sertraline (ZOLOFT) 50 MG tablet         Return in about 3 months (around 2/10/2022) for depression, pap.     Derek Chaparro MD      This dictation was generated by voice recognition computer software. Although all attempts are made to edit the dictation for accuracy, there may be errors in the transcription that are not intended.

## 2021-11-10 NOTE — ASSESSMENT & PLAN NOTE
She feels can be better controlled, potentially contributing to subjective noted memory change  -increase Zoloft to 75 mg  -Continue regular physical activity yoga, meditation, outdoor times.  - is dealing with uncontrolled ET and some DBS complications which puts significant strain on their relationship and her individually. She is very optimistic and positive and able to both help support her  and make sure she is not letting go of her own interests and motivations.

## 2021-11-10 NOTE — ASSESSMENT & PLAN NOTE
Last visit noted some memory changes lately for which she came back for MoCA testing today. MoCA is normal 29/30, losing 1 point for attention. TSH normal, B12 normal (lower end). History and exam are not suggestive of parkinsonism's features, NPH. Highly educated, physically and mentally active. At this point I believe changes noticed by her are more related to home stressors ( is dealing with health issues) and depression than objective cognitive decline.   -MoCA scanned and will be used as baseline moving forward.   If any further cognitive changes noted by her, family, friends will let me know for further evaluation.  -Depression management as below  -can add multivitamin/b12 supplement though not clearly deficient   -Continue general lifestyle interventions to preserve/improve cognition: exercise, healthy diet, regular social activities, optimal sleep, preserving over learned behaviors.   -tight BP control had been shown to help lower the risk of progression to dementia

## 2022-01-21 DIAGNOSIS — F33.1 MODERATE EPISODE OF RECURRENT MAJOR DEPRESSIVE DISORDER (HCC): ICD-10-CM

## 2022-02-01 ENCOUNTER — HOSPITAL ENCOUNTER (OUTPATIENT)
Dept: MAMMOGRAPHY | Age: 62
Discharge: HOME | End: 2022-02-01
Payer: COMMERCIAL

## 2022-02-01 ENCOUNTER — TELEPHONE (OUTPATIENT)
Dept: INTERNAL MEDICINE CLINIC | Age: 62
End: 2022-02-01

## 2022-02-01 VITALS — BODY MASS INDEX: 22.29 KG/M2 | HEIGHT: 67 IN | WEIGHT: 142 LBS

## 2022-02-01 DIAGNOSIS — Z12.31 VISIT FOR SCREENING MAMMOGRAM: ICD-10-CM

## 2022-02-01 PROCEDURE — 77063 BREAST TOMOSYNTHESIS BI: CPT

## 2022-02-01 NOTE — TELEPHONE ENCOUNTER
----- Message from Elvinrobert Cutler sent at 2/1/2022  3:57 PM EST -----  Subject: Appointment Request    Reason for Call: Routine Pre-Op    QUESTIONS  Type of Appointment? Established Patient  Reason for appointment request? No appointments available during search  Additional Information for Provider? Patient is calling in to inform that   she needs a pre op for left shoulder, patient surgery is feb 11th she also   needs a EKG and the pre op to be done before the 9th if can please advise   ---------------------------------------------------------------------------  --------------  CALL BACK INFO  What is the best way for the office to contact you? OK to leave message on   voicemail  Preferred Call Back Phone Number? [phone]  ---------------------------------------------------------------------------  --------------  SCRIPT ANSWERS  Relationship to Patient? Self  Do you have questions for your provider that need to be answered prior to   scheduling your pre-op appointment? No  Have you been diagnosed with, awaiting test results for, or told that you   are suspected of having COVID-19 (Coronavirus)? (If patient has tested   negative or was tested as a requirement for work, school, or travel and   not based on symptoms, answer no)? No  Within the past two weeks have you developed any of the following symptoms   (answer no if symptoms have been present longer than 2 weeks or began   more than 2 weeks ago)? Fever or Chills, Cough, Shortness of breath or   difficulty breathing, Loss of taste or smell, Sore throat, Nasal   congestion, Sneezing or runny nose, Fatigue or generalized body aches   (answer no if pain is specific to a body part e.g. back pain), Diarrhea,   Headache? No  Have you had close contact with someone with COVID-19 in the last 14 days? No  (Service Expert  click yes below to proceed with [facility] As Usual   Scheduling)?  Yes

## 2022-02-08 ENCOUNTER — OFFICE VISIT (OUTPATIENT)
Dept: INTERNAL MEDICINE CLINIC | Age: 62
End: 2022-02-08
Payer: COMMERCIAL

## 2022-02-08 VITALS
HEART RATE: 66 BPM | TEMPERATURE: 97.2 F | DIASTOLIC BLOOD PRESSURE: 68 MMHG | OXYGEN SATURATION: 98 % | WEIGHT: 146.8 LBS | BODY MASS INDEX: 23.04 KG/M2 | HEIGHT: 67 IN | SYSTOLIC BLOOD PRESSURE: 118 MMHG

## 2022-02-08 DIAGNOSIS — G89.29 CHRONIC LEFT SHOULDER PAIN: ICD-10-CM

## 2022-02-08 DIAGNOSIS — M25.512 CHRONIC LEFT SHOULDER PAIN: ICD-10-CM

## 2022-02-08 DIAGNOSIS — Z01.818 PRE-OP EXAM: Primary | ICD-10-CM

## 2022-02-08 PROCEDURE — 93000 ELECTROCARDIOGRAM COMPLETE: CPT | Performed by: INTERNAL MEDICINE

## 2022-02-08 PROCEDURE — 99243 OFF/OP CNSLTJ NEW/EST LOW 30: CPT | Performed by: INTERNAL MEDICINE

## 2022-02-08 ASSESSMENT — PATIENT HEALTH QUESTIONNAIRE - PHQ9
1. LITTLE INTEREST OR PLEASURE IN DOING THINGS: 0
10. IF YOU CHECKED OFF ANY PROBLEMS, HOW DIFFICULT HAVE THESE PROBLEMS MADE IT FOR YOU TO DO YOUR WORK, TAKE CARE OF THINGS AT HOME, OR GET ALONG WITH OTHER PEOPLE: 0
9. THOUGHTS THAT YOU WOULD BE BETTER OFF DEAD, OR OF HURTING YOURSELF: 0
2. FEELING DOWN, DEPRESSED OR HOPELESS: 0
SUM OF ALL RESPONSES TO PHQ QUESTIONS 1-9: 0
4. FEELING TIRED OR HAVING LITTLE ENERGY: 0
SUM OF ALL RESPONSES TO PHQ QUESTIONS 1-9: 0
SUM OF ALL RESPONSES TO PHQ9 QUESTIONS 1 & 2: 0
5. POOR APPETITE OR OVEREATING: 0
SUM OF ALL RESPONSES TO PHQ QUESTIONS 1-9: 0
3. TROUBLE FALLING OR STAYING ASLEEP: 0
2. FEELING DOWN, DEPRESSED OR HOPELESS: 0
SUM OF ALL RESPONSES TO PHQ QUESTIONS 1-9: 0
SUM OF ALL RESPONSES TO PHQ QUESTIONS 1-9: 0
8. MOVING OR SPEAKING SO SLOWLY THAT OTHER PEOPLE COULD HAVE NOTICED. OR THE OPPOSITE, BEING SO FIGETY OR RESTLESS THAT YOU HAVE BEEN MOVING AROUND A LOT MORE THAN USUAL: 0
1. LITTLE INTEREST OR PLEASURE IN DOING THINGS: 0
SUM OF ALL RESPONSES TO PHQ9 QUESTIONS 1 & 2: 0
7. TROUBLE CONCENTRATING ON THINGS, SUCH AS READING THE NEWSPAPER OR WATCHING TELEVISION: 0
SUM OF ALL RESPONSES TO PHQ QUESTIONS 1-9: 0
SUM OF ALL RESPONSES TO PHQ QUESTIONS 1-9: 0
6. FEELING BAD ABOUT YOURSELF - OR THAT YOU ARE A FAILURE OR HAVE LET YOURSELF OR YOUR FAMILY DOWN: 0
SUM OF ALL RESPONSES TO PHQ QUESTIONS 1-9: 0

## 2022-02-08 NOTE — PROGRESS NOTES
Chief Complaint   Patient presents with    Pre-op Exam     Left shoulder @[facility]- 02/11/22     Fax [phone] and [phone]     Mikey Hernadez is a 63 y.o. female who presents for a preoperative physical examination. She is scheduled to have left shoulder arthroscopy  done by Dr. Silver Lemon at [facility]  2/11/22. History of Present Illness:      Ruth Pineda has been having left shoulder pain and stiffness for the last 1 year. Past Medical History:   Diagnosis Date    ACL (anterior cruciate ligament) tear 9/10/2013    Fibrocystic breast disease 10/3/2014    Heart murmur, systolic 48/1/8031    Iron deficiency anemia 7/13/2012    Meniscus tear 7/9/2012    Menopause 12/5/2012    S/P left knee arthroscopy, chondroplasty, and synovectomy with medial meniscus fraying 8/01/2012 4/19/2017    Tear of medial meniscus of right knee 7/6/2021    Unspecified vitamin D deficiency 7/12/2012        Review of patient's past surgical history indicates:     Past Surgical History:   Procedure Laterality Date    BREAST BIOPSY      COLONOSCOPY  08/24/2012    Normal, Vimal Burn repeat in 10 years    KNEE ARTHROSCOPY  8/1/2012    LEFT;CHONDROPLASTY,SYNOVECTOMY    MENISCECTOMY  2013    Meniscus repair                                                   Current Outpatient Medications   Medication Sig Dispense Refill    sertraline (ZOLOFT) 50 MG tablet Take 1.5 tablets by mouth daily 135 tablet 0    vitamin D 25 MCG (1000 UT) CAPS Take 1 capsule by mouth daily 90 capsule 0     No current facility-administered medications for this visit. No Known Allergies    Social History     Tobacco Use    Smoking status: Never Smoker    Smokeless tobacco: Never Used    Tobacco comment: Do not Use   Substance Use Topics    Alcohol use:  Yes     Alcohol/week: 4.0 standard drinks     Types: 4 Glasses of wine per week        Family History   Problem Relation Age of Onset    Cancer Mother    Amalia Romanbishop Heart Disease Father         Review Of Systems    Skin: no abnormal pigmentation, rash, scaling, itching, masses, hair or nail changes  Eyes: negative  Ears/Nose/Throat: negative  Respiratory: negative  Cardiovascular: negative  Gastrointestinal: negative  Genitourinary: negative  Musculoskeletal: left shoulder pain, decreased ROM, pain with sleeping  Neurologic: negative  Psychiatric: negative  Hematologic/Lymphatic/Immunologic: negative  Endocrine: negative    PHYSICAL EXAMINATION:  /68   Pulse 66   Temp 97.2 °F (36.2 °C)   Ht 5' 7\" (1.702 m)   Wt 146 lb 12.8 oz (66.6 kg)   LMP 09/29/2014   SpO2 98%   BMI 22.99 kg/m²   General appearance - healthy, alert, no distress  Skin - Skin color, texture, turgor normal. No rashes or lesions. Head - Normocephalic. No masses, lesions, tenderness or abnormalities  Eyes - conjunctivae/corneas clear. EOM's intact. .  Neck - Neck supple. No adenopathy. Thyroid symmetric, normal size,  Back - Back symmetric, no curvature. ROM normal. No CVA tenderness. Lungs - Percussion normal. Good diaphragmatic excursion. Lungs clear  Heart - Regular rate and rhythm, with no rub, murmur or gallop noted. Abdomen - Abdomen soft, non-tender. BS normal. No masses, organomegaly  Extremities - Extremities normal. No deformities, edema, or skin discoloration   Musculoskeletal - left shoulder stiffness   Neuro - Gait normal. Reflexes normal and symmetric. Sensation grossly normal.  No focal weakness      ASSESSMENT and PLAN:    1. Pre-op exam  I was asked to evaluate Karl Turner pre op for shoulder arthroscopy by Anita Riley. Medication adjustment: Kral Turner will hold all medicine on the morning of surgery. Cardioprotection: none needed. 2. Chronic left shoulder pain  Unimproved left shoulder pain. Arthroscopy planned. She is medically cleared for surgery and anesthesia. Copy to [facility]

## 2022-03-23 ENCOUNTER — OFFICE VISIT (OUTPATIENT)
Dept: INTERNAL MEDICINE CLINIC | Age: 62
End: 2022-03-23
Payer: COMMERCIAL

## 2022-03-23 VITALS
DIASTOLIC BLOOD PRESSURE: 72 MMHG | HEIGHT: 67 IN | HEART RATE: 63 BPM | OXYGEN SATURATION: 99 % | WEIGHT: 149.4 LBS | BODY MASS INDEX: 23.45 KG/M2 | SYSTOLIC BLOOD PRESSURE: 130 MMHG | TEMPERATURE: 98.1 F

## 2022-03-23 DIAGNOSIS — E55.9 VITAMIN D DEFICIENCY: ICD-10-CM

## 2022-03-23 DIAGNOSIS — F32.9 MAJOR DEPRESSIVE DISORDER, REMISSION STATUS UNSPECIFIED, UNSPECIFIED WHETHER RECURRENT: ICD-10-CM

## 2022-03-23 DIAGNOSIS — Z11.51 SPECIAL SCREENING EXAMINATION FOR HUMAN PAPILLOMAVIRUS (HPV): Primary | ICD-10-CM

## 2022-03-23 PROCEDURE — 99214 OFFICE O/P EST MOD 30 MIN: CPT | Performed by: INTERNAL MEDICINE

## 2022-03-23 RX ORDER — IBUPROFEN 800 MG/1
TABLET ORAL
COMMUNITY
Start: 2022-02-10

## 2022-03-23 ASSESSMENT — PATIENT HEALTH QUESTIONNAIRE - PHQ9
6. FEELING BAD ABOUT YOURSELF - OR THAT YOU ARE A FAILURE OR HAVE LET YOURSELF OR YOUR FAMILY DOWN: 0
2. FEELING DOWN, DEPRESSED OR HOPELESS: 0
1. LITTLE INTEREST OR PLEASURE IN DOING THINGS: 0
9. THOUGHTS THAT YOU WOULD BE BETTER OFF DEAD, OR OF HURTING YOURSELF: 0
10. IF YOU CHECKED OFF ANY PROBLEMS, HOW DIFFICULT HAVE THESE PROBLEMS MADE IT FOR YOU TO DO YOUR WORK, TAKE CARE OF THINGS AT HOME, OR GET ALONG WITH OTHER PEOPLE: 0
8. MOVING OR SPEAKING SO SLOWLY THAT OTHER PEOPLE COULD HAVE NOTICED. OR THE OPPOSITE, BEING SO FIGETY OR RESTLESS THAT YOU HAVE BEEN MOVING AROUND A LOT MORE THAN USUAL: 0
SUM OF ALL RESPONSES TO PHQ QUESTIONS 1-9: 0
SUM OF ALL RESPONSES TO PHQ QUESTIONS 1-9: 0
3. TROUBLE FALLING OR STAYING ASLEEP: 0
SUM OF ALL RESPONSES TO PHQ QUESTIONS 1-9: 0
4. FEELING TIRED OR HAVING LITTLE ENERGY: 0
SUM OF ALL RESPONSES TO PHQ9 QUESTIONS 1 & 2: 0
5. POOR APPETITE OR OVEREATING: 0
7. TROUBLE CONCENTRATING ON THINGS, SUCH AS READING THE NEWSPAPER OR WATCHING TELEVISION: 0
SUM OF ALL RESPONSES TO PHQ QUESTIONS 1-9: 0

## 2022-03-23 ASSESSMENT — ENCOUNTER SYMPTOMS: SHORTNESS OF BREATH: 0

## 2022-03-23 NOTE — PROGRESS NOTES
Patient Active Problem List   Diagnosis    Vitamin D deficiency    Menopause    Fibrocystic breast disease    Heart murmur, systolic    Primary osteoarthritis of left knee    Depression    Memory changes    Special screening examination for human papillomavirus (HPV)     Past Surgical History:   Procedure Laterality Date    BREAST BIOPSY      COLONOSCOPY  08/24/2012    Normal, Otto Bhardwajr repeat in 10 years    KNEE ARTHROSCOPY  8/1/2012    LEFT;CHONDROPLASTY,SYNOVECTOMY    MENISCECTOMY  2013    Meniscus repair     Social History     Socioeconomic History    Marital status:      Spouse name: Not on file    Number of children: 3    Years of education: Not on file    Highest education level: Not on file   Occupational History     Employer: [employer]   Tobacco Use    Smoking status: Never Smoker    Smokeless tobacco: Never Used    Tobacco comment: Do not Use   Substance and Sexual Activity    Alcohol use: Yes     Alcohol/week: 4.0 standard drinks     Types: 4 Glasses of wine per week    Drug use: No    Sexual activity: Yes     Partners: Female   Other Topics Concern    Not on file   Social History Narrative    Not on file     Social Determinants of Health     Financial Resource Strain: Low Risk     Difficulty of Paying Living Expenses: Not hard at all   Food Insecurity: No Food Insecurity    Worried About [facility] in the Last Year: Never true    [facility] in the Last Year: Never true   Transportation Needs:     Lack of Transportation (Medical): Not on file    Lack of Transportation (Non-Medical):  Not on file   Physical Activity:     Days of Exercise per Week: Not on file    Minutes of Exercise per Session: Not on file   Stress:     Feeling of Stress : Not on file   Social Connections:     Frequency of Communication with Friends and Family: Not on file    Frequency of Social Gatherings with Friends and Family: Not on file    Attends Spiritism Services: Not on file   [facility] [employer] or Organizations: Not on file    Attends Club or Organization Meetings: Not on file    Marital Status: Not on file   Intimate Partner Violence:     Fear of Current or Ex-Partner: Not on file    Emotionally Abused: Not on file    Physically Abused: Not on file    Sexually Abused: Not on file   Housing Stability:     Unable to Pay for Housing in the Last Year: Not on file    Number of [place] in the Last Year: Not on file    Unstable Housing in the Last Year: Not on file      Family History   Problem Relation Age of Onset    Cancer Mother     Heart Disease Father        ROS  Review of Systems   Constitutional: Negative for chills, fever and unexpected weight change. Respiratory: Negative for shortness of breath. Cardiovascular: Negative for chest pain, palpitations and leg swelling. PE  Vitals:    03/23/22 0837   BP: 130/72   Pulse: 63   Temp: 98.1 °F (36.7 °C)   SpO2: 99%   Weight: 149 lb 6.4 oz (67.8 kg)   Height: 5' 7\" (1.702 m)     Estimated body mass index is 23.4 kg/m² as calculated from the following:    Height as of this encounter: 5' 7\" (1.702 m). Weight as of this encounter: 149 lb 6.4 oz (67.8 kg). Physical Exam  Constitutional:       General: She is not in acute distress. Appearance: Normal appearance. She is not ill-appearing, toxic-appearing or diaphoretic. HENT:      Head: Normocephalic and atraumatic. Nose: Nose normal.   Eyes:      Extraocular Movements: Extraocular movements intact. Conjunctiva/sclera: Conjunctivae normal.      Pupils: Pupils are equal, round, and reactive to light. Pulmonary:      Effort: Pulmonary effort is normal. No respiratory distress. Genitourinary:     General: Normal vulva. Vagina: No vaginal discharge. Musculoskeletal:      Cervical back: Normal range of motion and neck supple. Skin:     Coloration: Skin is not jaundiced or pale.    Neurological:      Mental Status: She is alert and oriented to person, place, and time. Psychiatric:         Mood and Affect: Mood normal.         Behavior: Behavior normal.           Alber José MD    This dictation was generated by voice recognition computer software. Although all attempts are made to edit the dictation for accuracy, there may be errors in the transcription that are not intended.

## 2022-03-23 NOTE — ASSESSMENT & PLAN NOTE
Well-controlled and tolerating well  -Continue Zoloft to 75 mg  -Continue regular physical activity yoga, meditation, outdoor times.  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

## 2022-04-04 DIAGNOSIS — Z11.51 ENCOUNTER FOR SCREENING FOR HUMAN PAPILLOMAVIRUS (HPV): Primary | ICD-10-CM

## 2022-04-05 LAB
HPV COMMENT: NORMAL
HPV TYPE 16: NOT DETECTED
HPV TYPE 18: NOT DETECTED
HPVOH (OTHER TYPES): NOT DETECTED

## 2022-04-22 PROBLEM — Z11.51 SPECIAL SCREENING EXAMINATION FOR HUMAN PAPILLOMAVIRUS (HPV): Status: RESOLVED | Noted: 2022-03-23 | Resolved: 2022-04-22

## 2022-08-12 DIAGNOSIS — F33.1 MODERATE EPISODE OF RECURRENT MAJOR DEPRESSIVE DISORDER (HCC): ICD-10-CM

## 2022-09-27 DIAGNOSIS — F33.1 MODERATE EPISODE OF RECURRENT MAJOR DEPRESSIVE DISORDER (HCC): ICD-10-CM

## 2023-02-15 ENCOUNTER — HOSPITAL ENCOUNTER (OUTPATIENT)
Dept: MAMMOGRAPHY | Age: 63
Discharge: HOME | End: 2023-02-15
Payer: COMMERCIAL

## 2023-02-15 VITALS — BODY MASS INDEX: 23.14 KG/M2 | WEIGHT: 144 LBS | HEIGHT: 66 IN

## 2023-02-15 DIAGNOSIS — Z12.31 VISIT FOR SCREENING MAMMOGRAM: ICD-10-CM

## 2023-02-15 PROCEDURE — 77067 SCR MAMMO BI INCL CAD: CPT

## 2023-09-30 DIAGNOSIS — F33.1 MODERATE EPISODE OF RECURRENT MAJOR DEPRESSIVE DISORDER (HCC): ICD-10-CM

## 2023-10-02 ASSESSMENT — PATIENT HEALTH QUESTIONNAIRE - PHQ9
SUM OF ALL RESPONSES TO PHQ QUESTIONS 1-9: 0
1. LITTLE INTEREST OR PLEASURE IN DOING THINGS: 0
6. FEELING BAD ABOUT YOURSELF - OR THAT YOU ARE A FAILURE OR HAVE LET YOURSELF OR YOUR FAMILY DOWN: 0
4. FEELING TIRED OR HAVING LITTLE ENERGY: NOT AT ALL
5. POOR APPETITE OR OVEREATING: 0
8. MOVING OR SPEAKING SO SLOWLY THAT OTHER PEOPLE COULD HAVE NOTICED. OR THE OPPOSITE, BEING SO FIGETY OR RESTLESS THAT YOU HAVE BEEN MOVING AROUND A LOT MORE THAN USUAL: 0
3. TROUBLE FALLING OR STAYING ASLEEP: NOT AT ALL
5. POOR APPETITE OR OVEREATING: NOT AT ALL
9. THOUGHTS THAT YOU WOULD BE BETTER OFF DEAD, OR OF HURTING YOURSELF: NOT AT ALL
2. FEELING DOWN, DEPRESSED OR HOPELESS: 0
6. FEELING BAD ABOUT YOURSELF - OR THAT YOU ARE A FAILURE OR HAVE LET YOURSELF OR YOUR FAMILY DOWN: NOT AT ALL
SUM OF ALL RESPONSES TO PHQ QUESTIONS 1-9: 0
SUM OF ALL RESPONSES TO PHQ9 QUESTIONS 1 & 2: 0
3. TROUBLE FALLING OR STAYING ASLEEP: 0
10. IF YOU CHECKED OFF ANY PROBLEMS, HOW DIFFICULT HAVE THESE PROBLEMS MADE IT FOR YOU TO DO YOUR WORK, TAKE CARE OF THINGS AT HOME, OR GET ALONG WITH OTHER PEOPLE: 0
SUM OF ALL RESPONSES TO PHQ QUESTIONS 1-9: 0
9. THOUGHTS THAT YOU WOULD BE BETTER OFF DEAD, OR OF HURTING YOURSELF: 0
SUM OF ALL RESPONSES TO PHQ QUESTIONS 1-9: 0
7. TROUBLE CONCENTRATING ON THINGS, SUCH AS READING THE NEWSPAPER OR WATCHING TELEVISION: NOT AT ALL
SUM OF ALL RESPONSES TO PHQ QUESTIONS 1-9: 0
4. FEELING TIRED OR HAVING LITTLE ENERGY: 0
2. FEELING DOWN, DEPRESSED OR HOPELESS: NOT AT ALL
7. TROUBLE CONCENTRATING ON THINGS, SUCH AS READING THE NEWSPAPER OR WATCHING TELEVISION: 0
1. LITTLE INTEREST OR PLEASURE IN DOING THINGS: NOT AT ALL
10. IF YOU CHECKED OFF ANY PROBLEMS, HOW DIFFICULT HAVE THESE PROBLEMS MADE IT FOR YOU TO DO YOUR WORK, TAKE CARE OF THINGS AT HOME, OR GET ALONG WITH OTHER PEOPLE: NOT DIFFICULT AT ALL
8. MOVING OR SPEAKING SO SLOWLY THAT OTHER PEOPLE COULD HAVE NOTICED. OR THE OPPOSITE - BEING SO FIDGETY OR RESTLESS THAT YOU HAVE BEEN MOVING AROUND A LOT MORE THAN USUAL: NOT AT ALL

## 2023-10-05 ENCOUNTER — OFFICE VISIT (OUTPATIENT)
Dept: INTERNAL MEDICINE CLINIC | Age: 63
End: 2023-10-05
Payer: COMMERCIAL

## 2023-10-05 VITALS
HEIGHT: 66 IN | OXYGEN SATURATION: 99 % | HEART RATE: 51 BPM | WEIGHT: 149 LBS | BODY MASS INDEX: 23.95 KG/M2 | TEMPERATURE: 97.4 F | SYSTOLIC BLOOD PRESSURE: 120 MMHG | DIASTOLIC BLOOD PRESSURE: 78 MMHG

## 2023-10-05 DIAGNOSIS — Z13.220 LIPID SCREENING: ICD-10-CM

## 2023-10-05 DIAGNOSIS — Z00.00 ANNUAL PHYSICAL EXAM: ICD-10-CM

## 2023-10-05 DIAGNOSIS — Z13.220 LIPID SCREENING: Primary | ICD-10-CM

## 2023-10-05 DIAGNOSIS — Z13.1 DIABETES MELLITUS SCREENING: ICD-10-CM

## 2023-10-05 DIAGNOSIS — E53.8 B12 DEFICIENCY: ICD-10-CM

## 2023-10-05 DIAGNOSIS — F33.1 MODERATE EPISODE OF RECURRENT MAJOR DEPRESSIVE DISORDER (HCC): ICD-10-CM

## 2023-10-05 DIAGNOSIS — F32.9 MAJOR DEPRESSIVE DISORDER, REMISSION STATUS UNSPECIFIED, UNSPECIFIED WHETHER RECURRENT: ICD-10-CM

## 2023-10-05 LAB
ALBUMIN SERPL-MCNC: 4.6 G/DL (ref 3.4–5)
ALBUMIN/GLOB SERPL: 1.9 {RATIO} (ref 1.1–2.2)
ALP SERPL-CCNC: 56 U/L (ref 40–129)
ALT SERPL-CCNC: 9 U/L (ref 10–40)
ANION GAP SERPL CALCULATED.3IONS-SCNC: 10 MMOL/L (ref 3–16)
AST SERPL-CCNC: 16 U/L (ref 15–37)
BASOPHILS # BLD: 0 K/UL (ref 0–0.2)
BASOPHILS NFR BLD: 0.4 %
BILIRUB SERPL-MCNC: 0.4 MG/DL (ref 0–1)
BUN SERPL-MCNC: 12 MG/DL (ref 7–20)
CALCIUM SERPL-MCNC: 8.9 MG/DL (ref 8.3–10.6)
CHLORIDE SERPL-SCNC: 102 MMOL/L (ref 99–110)
CHOLEST SERPL-MCNC: 224 MG/DL (ref 0–199)
CO2 SERPL-SCNC: 27 MMOL/L (ref 21–32)
CREAT SERPL-MCNC: 0.7 MG/DL (ref 0.6–1.2)
DEPRECATED RDW RBC AUTO: 14.3 % (ref 12.4–15.4)
EOSINOPHIL # BLD: 0.1 K/UL (ref 0–0.6)
EOSINOPHIL NFR BLD: 2.5 %
FOLATE SERPL-MCNC: 10.23 NG/ML (ref 4.78–24.2)
GFR SERPLBLD CREATININE-BSD FMLA CKD-EPI: >60 ML/MIN/{1.73_M2}
GLUCOSE SERPL-MCNC: 93 MG/DL (ref 70–99)
HCT VFR BLD AUTO: 39.1 % (ref 36–48)
HDLC SERPL-MCNC: 121 MG/DL (ref 40–60)
HGB BLD-MCNC: 13.3 G/DL (ref 12–16)
LDLC SERPL CALC-MCNC: 91 MG/DL
LYMPHOCYTES # BLD: 1.8 K/UL (ref 1–5.1)
LYMPHOCYTES NFR BLD: 33.5 %
MCH RBC QN AUTO: 31.4 PG (ref 26–34)
MCHC RBC AUTO-ENTMCNC: 34 G/DL (ref 31–36)
MCV RBC AUTO: 92.3 FL (ref 80–100)
MONOCYTES # BLD: 0.5 K/UL (ref 0–1.3)
MONOCYTES NFR BLD: 8.7 %
NEUTROPHILS # BLD: 2.9 K/UL (ref 1.7–7.7)
NEUTROPHILS NFR BLD: 54.9 %
PLATELET # BLD AUTO: 198 K/UL (ref 135–450)
PMV BLD AUTO: 10 FL (ref 5–10.5)
POTASSIUM SERPL-SCNC: 4.6 MMOL/L (ref 3.5–5.1)
PROT SERPL-MCNC: 7 G/DL (ref 6.4–8.2)
RBC # BLD AUTO: 4.23 M/UL (ref 4–5.2)
SODIUM SERPL-SCNC: 139 MMOL/L (ref 136–145)
TRIGL SERPL-MCNC: 61 MG/DL (ref 0–150)
VIT B12 SERPL-MCNC: 316 PG/ML (ref 211–911)
VLDLC SERPL CALC-MCNC: 12 MG/DL
WBC # BLD AUTO: 5.3 K/UL (ref 4–11)

## 2023-10-05 PROCEDURE — 99396 PREV VISIT EST AGE 40-64: CPT | Performed by: INTERNAL MEDICINE

## 2023-10-05 SDOH — ECONOMIC STABILITY: FOOD INSECURITY: WITHIN THE PAST 12 MONTHS, THE FOOD YOU BOUGHT JUST DIDN'T LAST AND YOU DIDN'T HAVE MONEY TO GET MORE.: NEVER TRUE

## 2023-10-05 SDOH — ECONOMIC STABILITY: HOUSING INSECURITY
IN THE LAST 12 MONTHS, WAS THERE A TIME WHEN YOU DID NOT HAVE A STEADY PLACE TO SLEEP OR SLEPT IN A SHELTER (INCLUDING NOW)?: NO

## 2023-10-05 SDOH — ECONOMIC STABILITY: FOOD INSECURITY: WITHIN THE PAST 12 MONTHS, YOU WORRIED THAT YOUR FOOD WOULD RUN OUT BEFORE YOU GOT MONEY TO BUY MORE.: NEVER TRUE

## 2023-10-05 SDOH — ECONOMIC STABILITY: INCOME INSECURITY: HOW HARD IS IT FOR YOU TO PAY FOR THE VERY BASICS LIKE FOOD, HOUSING, MEDICAL CARE, AND HEATING?: NOT HARD AT ALL

## 2023-10-05 ASSESSMENT — ENCOUNTER SYMPTOMS
EYE REDNESS: 0
NAUSEA: 0
COUGH: 0
COLOR CHANGE: 0
DIARRHEA: 0
ABDOMINAL PAIN: 0
BLOOD IN STOOL: 0
SHORTNESS OF BREATH: 0

## 2023-10-05 NOTE — ASSESSMENT & PLAN NOTE
Well-controlled and tolerating well  -Continue Zoloft to 75 mg  -Continue regular physical activity yoga, meditation, outdoor times.  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

## 2023-10-05 NOTE — PATIENT INSTRUCTIONS
-eat a healthy, well-balanced diet  -exercise at least 30min/day 5x/week for heart and bone health  -Check skin regularly for new moles or changes in moles. wear sunscreen at least SPF 30 and don't forget to reapply every 3-4 hours or if you are in the water  -Follow up with dentist at least twice/year.  -Follow up with eye doctor at least once/year. Food Calcium, milligrams   Milk (skim, 2 percent, or whole, 8 oz [240 mL]) 300   Yogurt (6 oz [168 g]) 250   Orange juice (with calcium, 8 oz [240 mL]) 300   Tofu with calcium (1/2 cup [959 g]) 435   Cheese (1 oz [28 g]) 195 to 335 (hard cheese = higher calcium)   Cottage cheese (1/2 cup [272 g]) 130   Ice cream or frozen yogurt (1/2 cup [800 g]) 100   Soy milk (8 oz [240 mL]) 300   Beans (1/2 cup cooked [205 g]) 60 to 80   Dark, leafy green vegetables (1/2 cup cooked [622 g]) 50 to 135   Almonds (24 whole) 70   Orange (1 medium) 60       An optimal diet for treatment or prevention of osteoporosis includes an adequate intake of calories (to avoid malnutrition), calcium, and vitamin D. Postmenopausal women who are getting adequate calcium from dietary intake alone (approximately 1200 mg daily) do not need to take calcium supplements. Women with inadequate dietary intake should take supplemental elemental calcium (generally 500 to 1000 mg/day), in divided doses at mealtime, such that their total calcium intake (diet plus supplements) approximates 1200 mg/day. Women should also ingest a total of 800 international units of vitamin D daily. weight-bearing exercise (walking or equivalent) 30-40 min, 3 or 4 times a week and resistance exercise can be helpful as well.

## 2023-10-05 NOTE — ASSESSMENT & PLAN NOTE
Here for annual physical exam, overall doing well from a clinical standpoint and has no concerns. As for health maintenance:  -Normal Pap and neg HPV 2022, next 2027 which will probably be the last one  -History of abnormal mammography in 2016, benign biopsy, gets yearly mammograms. Mother had likely benign breast tumor.  Last mammo negative 02/2023  -Normal colonoscopy 11/22, next 2032  -Not indicated for lung cancer screening  -BP WNL today  -A1c, lipid panel ordered  -B12 on the lower end of normal last check, will repeat, likely need to start supplement  -HIV and HCV negative (2019)  -Negative depression and DV screen today  -Up-to-date with flu, covid vaccines, needs td   -Advised to eat a healthy, well-balanced diet  -Advised to exercise at least 30min/day 5x/week for heart and bone health  -Follow up with dentist at least twice/year.  -Follow up with eye doctor at least once/year.  -Calcium goal is 5164-4893 mg a day ideally from diet (dairy, green leafy vegetables, nuts), vit D 1000 unit

## 2023-10-06 LAB
EST. AVERAGE GLUCOSE BLD GHB EST-MCNC: 102.5 MG/DL
HBA1C MFR BLD: 5.2 %

## 2023-10-10 PROBLEM — E53.8 B12 DEFICIENCY: Status: ACTIVE | Noted: 2023-10-10

## 2023-10-31 NOTE — TELEPHONE ENCOUNTER
Medication:   Requested Prescriptions     Pending Prescriptions Disp Refills    Cholecalciferol (VITAMIN D3) 25 MCG (1000 UT) CAPS [Pharmacy Med Name: VITAMIN D3 1,000 UNIT SOFTGEL] 30 capsule      Sig: TAKE ONE CAPSULE BY MOUTH DAILY     Last Filled:  # 90 with 1 refill on 4/26/23    Last appt: 10/5/2023   Next appt: Visit date not found    Last OARRS:        No data to display               10/7/20 0710 10/8/14 0735 7/11/12 0704     Vit D, 25-Hydroxy >=30 ng/mL 37.4  30 R, CM  18.8 Low  R, CM    Comment: <=20 ng/mL. ........... Juan Jose Guallpa

## 2024-01-25 DIAGNOSIS — F33.1 MODERATE EPISODE OF RECURRENT MAJOR DEPRESSIVE DISORDER (HCC): ICD-10-CM

## 2024-02-22 ENCOUNTER — HOSPITAL ENCOUNTER (OUTPATIENT)
Dept: MAMMOGRAPHY | Age: 64
Discharge: HOME | End: 2024-02-22
Payer: COMMERCIAL

## 2024-02-22 VITALS — HEIGHT: 67 IN | BODY MASS INDEX: 22.76 KG/M2 | WEIGHT: 145 LBS

## 2024-02-22 DIAGNOSIS — Z12.31 VISIT FOR SCREENING MAMMOGRAM: ICD-10-CM

## 2024-02-22 PROCEDURE — 77063 BREAST TOMOSYNTHESIS BI: CPT

## 2024-08-23 DIAGNOSIS — F33.1 MODERATE EPISODE OF RECURRENT MAJOR DEPRESSIVE DISORDER (HCC): ICD-10-CM

## 2024-08-23 NOTE — TELEPHONE ENCOUNTER
Last appointment: 10/5/2023  Next appointment: 10/8/2024  Last refill: 1/29/24    Requested Prescriptions     Pending Prescriptions Disp Refills    sertraline (ZOLOFT) 50 MG tablet [Pharmacy Med Name: SERTRALINE HCL 50 MG TABLET] 135 tablet 1     Sig: TAKE 1 AND 1/2 TABLET BY MOUTH DAILY

## 2024-09-30 ENCOUNTER — TELEPHONE (OUTPATIENT)
Dept: INTERNAL MEDICINE CLINIC | Age: 64
End: 2024-09-30

## 2024-09-30 DIAGNOSIS — Z00.00 WELL ADULT EXAM: Primary | ICD-10-CM

## 2024-09-30 DIAGNOSIS — Z13.1 DIABETES MELLITUS SCREENING: ICD-10-CM

## 2024-09-30 DIAGNOSIS — Z13.220 LIPID SCREENING: ICD-10-CM

## 2024-09-30 DIAGNOSIS — E53.8 B12 DEFICIENCY: ICD-10-CM

## 2024-09-30 NOTE — TELEPHONE ENCOUNTER
Patient is requesting labs to be put in so they can be completed ahead of their appointment on October 8th.    Please call patient to advise: [phone]

## 2024-10-05 DIAGNOSIS — Z00.00 WELL ADULT EXAM: ICD-10-CM

## 2024-10-05 DIAGNOSIS — Z13.220 LIPID SCREENING: ICD-10-CM

## 2024-10-05 DIAGNOSIS — E53.8 B12 DEFICIENCY: ICD-10-CM

## 2024-10-05 DIAGNOSIS — Z13.1 DIABETES MELLITUS SCREENING: ICD-10-CM

## 2024-10-05 LAB
ALBUMIN SERPL-MCNC: 4.2 G/DL (ref 3.4–5)
ALBUMIN/GLOB SERPL: 1.8 {RATIO} (ref 1.1–2.2)
ALP SERPL-CCNC: 48 U/L (ref 40–129)
ALT SERPL-CCNC: 12 U/L (ref 10–40)
ANION GAP SERPL CALCULATED.3IONS-SCNC: 10 MMOL/L (ref 3–16)
AST SERPL-CCNC: 16 U/L (ref 15–37)
BASOPHILS # BLD: 0.1 K/UL (ref 0–0.2)
BASOPHILS NFR BLD: 1.1 %
BILIRUB SERPL-MCNC: <0.2 MG/DL (ref 0–1)
BUN SERPL-MCNC: 17 MG/DL (ref 7–20)
CALCIUM SERPL-MCNC: 9.2 MG/DL (ref 8.3–10.6)
CHLORIDE SERPL-SCNC: 105 MMOL/L (ref 99–110)
CHOLEST SERPL-MCNC: 209 MG/DL (ref 0–199)
CO2 SERPL-SCNC: 24 MMOL/L (ref 21–32)
CREAT SERPL-MCNC: 0.7 MG/DL (ref 0.6–1.2)
DEPRECATED RDW RBC AUTO: 14 % (ref 12.4–15.4)
EOSINOPHIL # BLD: 0.1 K/UL (ref 0–0.6)
EOSINOPHIL NFR BLD: 3 %
FOLATE SERPL-MCNC: 7.67 NG/ML (ref 4.78–24.2)
GFR SERPLBLD CREATININE-BSD FMLA CKD-EPI: >90 ML/MIN/{1.73_M2}
GLUCOSE SERPL-MCNC: 74 MG/DL (ref 70–99)
HCT VFR BLD AUTO: 36.1 % (ref 36–48)
HDLC SERPL-MCNC: 98 MG/DL (ref 40–60)
HGB BLD-MCNC: 12 G/DL (ref 12–16)
LDLC SERPL CALC-MCNC: 95 MG/DL
LYMPHOCYTES # BLD: 1.7 K/UL (ref 1–5.1)
LYMPHOCYTES NFR BLD: 34.6 %
MCH RBC QN AUTO: 30.4 PG (ref 26–34)
MCHC RBC AUTO-ENTMCNC: 33.3 G/DL (ref 31–36)
MCV RBC AUTO: 91.4 FL (ref 80–100)
MONOCYTES # BLD: 0.4 K/UL (ref 0–1.3)
MONOCYTES NFR BLD: 8.5 %
NEUTROPHILS # BLD: 2.6 K/UL (ref 1.7–7.7)
NEUTROPHILS NFR BLD: 52.8 %
PLATELET # BLD AUTO: 176 K/UL (ref 135–450)
PMV BLD AUTO: 10.7 FL (ref 5–10.5)
POTASSIUM SERPL-SCNC: 4.3 MMOL/L (ref 3.5–5.1)
PROT SERPL-MCNC: 6.5 G/DL (ref 6.4–8.2)
RBC # BLD AUTO: 3.94 M/UL (ref 4–5.2)
SODIUM SERPL-SCNC: 139 MMOL/L (ref 136–145)
TRIGL SERPL-MCNC: 79 MG/DL (ref 0–150)
VIT B12 SERPL-MCNC: 506 PG/ML (ref 211–911)
VLDLC SERPL CALC-MCNC: 16 MG/DL
WBC # BLD AUTO: 4.9 K/UL (ref 4–11)

## 2024-10-06 LAB
EST. AVERAGE GLUCOSE BLD GHB EST-MCNC: 105.4 MG/DL
HBA1C MFR BLD: 5.3 %

## 2024-10-07 ASSESSMENT — PATIENT HEALTH QUESTIONNAIRE - PHQ9
SUM OF ALL RESPONSES TO PHQ QUESTIONS 1-9: 0
4. FEELING TIRED OR HAVING LITTLE ENERGY: NOT AT ALL
9. THOUGHTS THAT YOU WOULD BE BETTER OFF DEAD, OR OF HURTING YOURSELF: NOT AT ALL
3. TROUBLE FALLING OR STAYING ASLEEP: NOT AT ALL
10. IF YOU CHECKED OFF ANY PROBLEMS, HOW DIFFICULT HAVE THESE PROBLEMS MADE IT FOR YOU TO DO YOUR WORK, TAKE CARE OF THINGS AT HOME, OR GET ALONG WITH OTHER PEOPLE: NOT DIFFICULT AT ALL
7. TROUBLE CONCENTRATING ON THINGS, SUCH AS READING THE NEWSPAPER OR WATCHING TELEVISION: NOT AT ALL
3. TROUBLE FALLING OR STAYING ASLEEP: NOT AT ALL
6. FEELING BAD ABOUT YOURSELF - OR THAT YOU ARE A FAILURE OR HAVE LET YOURSELF OR YOUR FAMILY DOWN: NOT AT ALL
SUM OF ALL RESPONSES TO PHQ QUESTIONS 1-9: 0
9. THOUGHTS THAT YOU WOULD BE BETTER OFF DEAD, OR OF HURTING YOURSELF: NOT AT ALL
8. MOVING OR SPEAKING SO SLOWLY THAT OTHER PEOPLE COULD HAVE NOTICED. OR THE OPPOSITE, BEING SO FIGETY OR RESTLESS THAT YOU HAVE BEEN MOVING AROUND A LOT MORE THAN USUAL: NOT AT ALL
SUM OF ALL RESPONSES TO PHQ QUESTIONS 1-9: 0
5. POOR APPETITE OR OVEREATING: NOT AT ALL
1. LITTLE INTEREST OR PLEASURE IN DOING THINGS: NOT AT ALL
8. MOVING OR SPEAKING SO SLOWLY THAT OTHER PEOPLE COULD HAVE NOTICED. OR THE OPPOSITE - BEING SO FIDGETY OR RESTLESS THAT YOU HAVE BEEN MOVING AROUND A LOT MORE THAN USUAL: NOT AT ALL
2. FEELING DOWN, DEPRESSED OR HOPELESS: NOT AT ALL
10. IF YOU CHECKED OFF ANY PROBLEMS, HOW DIFFICULT HAVE THESE PROBLEMS MADE IT FOR YOU TO DO YOUR WORK, TAKE CARE OF THINGS AT HOME, OR GET ALONG WITH OTHER PEOPLE: NOT DIFFICULT AT ALL
SUM OF ALL RESPONSES TO PHQ QUESTIONS 1-9: 0
SUM OF ALL RESPONSES TO PHQ9 QUESTIONS 1 & 2: 0
4. FEELING TIRED OR HAVING LITTLE ENERGY: NOT AT ALL
1. LITTLE INTEREST OR PLEASURE IN DOING THINGS: NOT AT ALL
SUM OF ALL RESPONSES TO PHQ QUESTIONS 1-9: 0
2. FEELING DOWN, DEPRESSED OR HOPELESS: NOT AT ALL
5. POOR APPETITE OR OVEREATING: NOT AT ALL
6. FEELING BAD ABOUT YOURSELF - OR THAT YOU ARE A FAILURE OR HAVE LET YOURSELF OR YOUR FAMILY DOWN: NOT AT ALL
7. TROUBLE CONCENTRATING ON THINGS, SUCH AS READING THE NEWSPAPER OR WATCHING TELEVISION: NOT AT ALL

## 2024-10-08 ENCOUNTER — OFFICE VISIT (OUTPATIENT)
Dept: INTERNAL MEDICINE CLINIC | Age: 64
End: 2024-10-08
Payer: COMMERCIAL

## 2024-10-08 VITALS
TEMPERATURE: 97.2 F | HEART RATE: 51 BPM | SYSTOLIC BLOOD PRESSURE: 132 MMHG | HEIGHT: 66 IN | BODY MASS INDEX: 21.47 KG/M2 | DIASTOLIC BLOOD PRESSURE: 85 MMHG | WEIGHT: 133.6 LBS | OXYGEN SATURATION: 100 %

## 2024-10-08 DIAGNOSIS — R00.1 BRADYCARDIA: ICD-10-CM

## 2024-10-08 DIAGNOSIS — Z00.00 ANNUAL PHYSICAL EXAM: ICD-10-CM

## 2024-10-08 DIAGNOSIS — D64.9 ANEMIA, UNSPECIFIED TYPE: Primary | ICD-10-CM

## 2024-10-08 PROCEDURE — 93000 ELECTROCARDIOGRAM COMPLETE: CPT | Performed by: INTERNAL MEDICINE

## 2024-10-08 PROCEDURE — 99396 PREV VISIT EST AGE 40-64: CPT | Performed by: INTERNAL MEDICINE

## 2024-10-08 SDOH — ECONOMIC STABILITY: FOOD INSECURITY: WITHIN THE PAST 12 MONTHS, YOU WORRIED THAT YOUR FOOD WOULD RUN OUT BEFORE YOU GOT MONEY TO BUY MORE.: NEVER TRUE

## 2024-10-08 SDOH — ECONOMIC STABILITY: FOOD INSECURITY: WITHIN THE PAST 12 MONTHS, THE FOOD YOU BOUGHT JUST DIDN'T LAST AND YOU DIDN'T HAVE MONEY TO GET MORE.: NEVER TRUE

## 2024-10-08 SDOH — ECONOMIC STABILITY: INCOME INSECURITY: HOW HARD IS IT FOR YOU TO PAY FOR THE VERY BASICS LIKE FOOD, HOUSING, MEDICAL CARE, AND HEATING?: NOT HARD AT ALL

## 2024-10-08 ASSESSMENT — ENCOUNTER SYMPTOMS
ABDOMINAL PAIN: 0
EYE REDNESS: 0
COUGH: 0
SHORTNESS OF BREATH: 0
BLOOD IN STOOL: 0
DIARRHEA: 0
NAUSEA: 0
COLOR CHANGE: 0

## 2024-10-08 NOTE — ASSESSMENT & PLAN NOTE
Here for annual physical exam, overall doing well from a clinical standpoint and has no concerns.  As for health maintenance:  -Normal Pap and neg HPV 2022, next 2027 which will probably be the last one  -History of abnormal mammography in 2016, benign biopsy, gets yearly mammograms.  Mother had likely benign breast tumor. Last mammo negative 02/2024  -Normal colonoscopy 11/22, next 2032  -Not indicated for lung cancer screening  -BP higher than her usual baseline today, will continue to monitor this   -A1c, lipid panel normal 10/2024  -B12 improved  -HIV and HCV negative (2019)  -Negative depression and DV screen today  -Up-to-date with flu, covid vaccines, needs td   -Advised to eat a healthy, well-balanced diet  -Advised to exercise at least 30min/day 5x/week for heart and bone health  -Follow up with dentist at least twice/year.  -Follow up with eye doctor at least once/year.  -Calcium goal is 1200 mg a day ideally from diet (dairy, green leafy vegetables, nuts), vit D 1000 unit

## 2024-10-08 NOTE — ASSESSMENT & PLAN NOTE
Though not clearly anemic, hemoglobin dropped by 1 point, likely related to donating blood 7/24  -Start with repeat CBC in 3 months, will check iron stores as well.  Recent B12 and folate normal

## 2024-10-08 NOTE — ASSESSMENT & PLAN NOTE
-Slight bradycardia, asymptomatic, likely related to exercising regularly and overall in good cardiac health.  This was seen before, EKG is unchanged from prior no acute ischemic changes.  No symptoms suggestive of hypothyroidism.   -Check TSH   30-Oct-2021 12:54

## 2024-10-08 NOTE — PROGRESS NOTES
[facility]  Preventive medicine/ physical exam visit   10/8/2024    Diamond Hernandez (:  1960) [place] 64 y.o. female, here for a preventive medicine evaluation.    Patient Active Problem List   Diagnosis    Vitamin D deficiency    Menopause    Annual physical exam    Fibrocystic breast disease    Heart murmur, systolic    Primary osteoarthritis of left knee    Depression    Memory changes    B12 deficiency    Anemia    Bradycardia     ASSESSMENT/ PLAN:    Annual physical exam  Here for annual physical exam, overall doing well from a clinical standpoint and has no concerns.  As for health maintenance:  -Normal Pap and neg HPV , next  which will probably be the last one  -History of abnormal mammography in 2016, benign biopsy, gets yearly mammograms.  Mother had likely benign breast tumor. Last mammo negative 2024  -Normal colonoscopy , next   -Not indicated for lung cancer screening  -BP higher than her usual baseline today, will continue to monitor this   -A1c, lipid panel normal 10/2024  -B12 improved  -HIV and HCV negative ()  -Negative depression and DV screen today  -Up-to-date with flu, covid vaccines, needs td   -Advised to eat a healthy, well-balanced diet  -Advised to exercise at least 30min/day 5x/week for heart and bone health  -Follow up with dentist at least twice/year.  -Follow up with eye doctor at least once/year.  -Calcium goal is 1200 mg a day ideally from diet (dairy, green leafy vegetables, nuts), vit D 1000 unit     Bradycardia  -Slight bradycardia, asymptomatic, likely related to exercising regularly and overall in good cardiac health.  This was seen before, EKG is unchanged from prior no acute ischemic changes.  No symptoms suggestive of hypothyroidism.   -Check TSH    Anemia  Though not clearly anemic, hemoglobin dropped by 1 point, likely related to donating blood   -Start with repeat CBC in 3 months, will check iron stores as well.  Recent

## 2024-11-26 DIAGNOSIS — E55.9 VITAMIN D DEFICIENCY: Primary | ICD-10-CM

## 2025-02-06 DIAGNOSIS — R00.1 BRADYCARDIA: ICD-10-CM

## 2025-02-06 DIAGNOSIS — D64.9 ANEMIA, UNSPECIFIED TYPE: ICD-10-CM

## 2025-02-06 LAB
BASOPHILS # BLD: 0 K/UL (ref 0–0.2)
BASOPHILS NFR BLD: 0.8 %
DEPRECATED RDW RBC AUTO: 13.9 % (ref 12.4–15.4)
EOSINOPHIL # BLD: 0.1 K/UL (ref 0–0.6)
EOSINOPHIL NFR BLD: 2 %
FERRITIN SERPL IA-MCNC: 35.3 NG/ML (ref 15–150)
HCT VFR BLD AUTO: 39.9 % (ref 36–48)
HGB BLD-MCNC: 13.3 G/DL (ref 12–16)
IRON SATN MFR SERPL: 37 % (ref 15–50)
IRON SERPL-MCNC: 115 UG/DL (ref 37–145)
LYMPHOCYTES # BLD: 2.1 K/UL (ref 1–5.1)
LYMPHOCYTES NFR BLD: 40.2 %
MCH RBC QN AUTO: 30.8 PG (ref 26–34)
MCHC RBC AUTO-ENTMCNC: 33.3 G/DL (ref 31–36)
MCV RBC AUTO: 92.3 FL (ref 80–100)
MONOCYTES # BLD: 0.5 K/UL (ref 0–1.3)
MONOCYTES NFR BLD: 10 %
NEUTROPHILS # BLD: 2.4 K/UL (ref 1.7–7.7)
NEUTROPHILS NFR BLD: 47 %
PLATELET # BLD AUTO: 190 K/UL (ref 135–450)
PMV BLD AUTO: 10.8 FL (ref 5–10.5)
RBC # BLD AUTO: 4.32 M/UL (ref 4–5.2)
TIBC SERPL-MCNC: 311 UG/DL (ref 260–445)
TSH SERPL DL<=0.005 MIU/L-ACNC: 1.11 UIU/ML (ref 0.27–4.2)
WBC # BLD AUTO: 5.1 K/UL (ref 4–11)

## 2025-02-17 DIAGNOSIS — F33.1 MODERATE EPISODE OF RECURRENT MAJOR DEPRESSIVE DISORDER (HCC): ICD-10-CM

## 2025-02-21 ENCOUNTER — HOSPITAL ENCOUNTER (OUTPATIENT)
Dept: MAMMOGRAPHY | Age: 65
Discharge: HOME | End: 2025-02-21
Payer: COMMERCIAL

## 2025-02-21 VITALS — BODY MASS INDEX: 21.38 KG/M2 | WEIGHT: 133 LBS | HEIGHT: 66 IN

## 2025-02-21 DIAGNOSIS — Z12.31 VISIT FOR SCREENING MAMMOGRAM: ICD-10-CM

## 2025-02-21 PROCEDURE — 77063 BREAST TOMOSYNTHESIS BI: CPT

## 2025-08-23 DIAGNOSIS — F33.1 MODERATE EPISODE OF RECURRENT MAJOR DEPRESSIVE DISORDER (HCC): ICD-10-CM

## 2025-08-25 ENCOUNTER — TELEPHONE (OUTPATIENT)
Dept: INTERNAL MEDICINE CLINIC | Age: 65
End: 2025-08-25

## 2025-08-25 DIAGNOSIS — F33.1 MODERATE EPISODE OF RECURRENT MAJOR DEPRESSIVE DISORDER (HCC): ICD-10-CM
